# Patient Record
Sex: FEMALE | Race: WHITE | URBAN - METROPOLITAN AREA
[De-identification: names, ages, dates, MRNs, and addresses within clinical notes are randomized per-mention and may not be internally consistent; named-entity substitution may affect disease eponyms.]

---

## 2018-08-27 PROBLEM — R00.1 SINUS BRADYCARDIA: Status: ACTIVE | Noted: 2018-08-27

## 2018-08-27 PROBLEM — R55 SYNCOPE AND COLLAPSE: Status: ACTIVE | Noted: 2018-08-27

## 2018-08-27 PROBLEM — S00.83XA TRAUMATIC HEMATOMA OF FOREHEAD: Status: ACTIVE | Noted: 2018-08-27

## 2018-08-27 PROBLEM — I48.91 NEW ONSET ATRIAL FIBRILLATION (HCC): Status: ACTIVE | Noted: 2018-08-27

## 2018-08-27 PROBLEM — R06.02 SOB (SHORTNESS OF BREATH): Status: ACTIVE | Noted: 2018-08-27

## 2018-08-27 PROBLEM — K74.69 NON-ALCOHOLIC MICRONODULAR CIRRHOSIS OF LIVER (HCC): Status: ACTIVE | Noted: 2018-08-27

## 2018-08-27 PROBLEM — R94.31 ABNORMAL FINDING ON EKG: Status: ACTIVE | Noted: 2018-08-27

## 2018-08-27 PROBLEM — S09.90XA CLOSED HEAD INJURY: Status: ACTIVE | Noted: 2018-08-27

## 2018-08-27 PROBLEM — E83.01 WILSON'S DISEASE: Status: ACTIVE | Noted: 2018-08-27

## 2018-08-27 PROBLEM — I21.29 SEPTAL INFARCTION (HCC): Status: ACTIVE | Noted: 2018-08-27

## 2018-08-27 PROBLEM — K74.60 LIVER CIRRHOSIS (HCC): Status: ACTIVE | Noted: 2018-08-27

## 2018-08-27 PROBLEM — R51.9 HEADACHE DISORDER: Status: ACTIVE | Noted: 2018-08-27

## 2018-08-27 PROBLEM — F41.9 ANXIETY DISORDER: Status: ACTIVE | Noted: 2018-08-27

## 2018-08-27 PROBLEM — E87.6 HYPOKALEMIA: Status: ACTIVE | Noted: 2018-08-27

## 2018-08-27 PROBLEM — R06.02 SOB (SHORTNESS OF BREATH): Status: RESOLVED | Noted: 2018-08-27 | Resolved: 2018-08-27

## 2018-08-27 PROBLEM — F12.90 MARIJUANA SMOKER: Status: ACTIVE | Noted: 2018-08-27

## 2018-08-27 RX ORDER — SUMATRIPTAN 50 MG/1
50 TABLET, FILM COATED ORAL
COMMUNITY

## 2018-08-27 RX ORDER — TRIENTINE HYDROCHLORIDE 250 MG/1
250 CAPSULE ORAL 2 TIMES DAILY
COMMUNITY

## 2018-08-27 RX ORDER — CLONAZEPAM 0.5 MG/1
0.5 TABLET ORAL 2 TIMES DAILY PRN
COMMUNITY

## 2018-08-27 RX ORDER — CYCLOBENZAPRINE HCL 10 MG
5 TABLET ORAL 3 TIMES DAILY PRN
COMMUNITY

## 2018-08-27 RX ORDER — ONDANSETRON 4 MG/1
4 TABLET, FILM COATED ORAL EVERY 6 HOURS PRN
COMMUNITY

## 2018-08-27 RX ORDER — RANITIDINE 300 MG/1
300 TABLET ORAL DAILY
COMMUNITY

## 2018-08-27 RX ORDER — ASPIRIN 81 MG/1
81 TABLET, CHEWABLE ORAL DAILY
COMMUNITY

## 2018-08-30 ENCOUNTER — OFFICE VISIT (OUTPATIENT)
Dept: CARDIOLOGY CLINIC | Age: 28
End: 2018-08-30

## 2018-08-30 VITALS
HEART RATE: 76 BPM | HEIGHT: 64 IN | TEMPERATURE: 97.5 F | DIASTOLIC BLOOD PRESSURE: 66 MMHG | WEIGHT: 136.7 LBS | BODY MASS INDEX: 23.34 KG/M2 | SYSTOLIC BLOOD PRESSURE: 118 MMHG | OXYGEN SATURATION: 98 % | RESPIRATION RATE: 22 BRPM

## 2018-08-30 DIAGNOSIS — I21.29 SEPTAL INFARCTION (HCC): ICD-10-CM

## 2018-08-30 DIAGNOSIS — E83.01: ICD-10-CM

## 2018-08-30 DIAGNOSIS — R00.1 SINUS BRADYCARDIA: ICD-10-CM

## 2018-08-30 DIAGNOSIS — R94.31 ABNORMAL FINDING ON EKG: ICD-10-CM

## 2018-08-30 DIAGNOSIS — I48.91 ATRIAL FIBRILLATION WITH RVR (HCC): Primary | ICD-10-CM

## 2018-08-30 PROCEDURE — 99213 OFFICE O/P EST LOW 20 MIN: CPT | Performed by: INTERNAL MEDICINE

## 2018-08-30 RX ORDER — FLUTICASONE PROPIONATE 50 MCG
2 SPRAY, SUSPENSION (ML) NASAL
COMMUNITY
Start: 2017-12-04 | End: 2018-08-30 | Stop reason: ALTCHOICE

## 2018-08-30 ASSESSMENT — ENCOUNTER SYMPTOMS
VOMITING: 0
APNEA: 0
DIARRHEA: 0
NAUSEA: 0
SHORTNESS OF BREATH: 0
CHEST TIGHTNESS: 0
BLOOD IN STOOL: 0

## 2018-08-30 NOTE — PROGRESS NOTES
Subsequent Progress Note  Patient: Ansley Leiva  YOB: 1990  MRN: [id number]    Chief Complaint:  Chief Complaint   Patient presents with    Follow-Up from [facility]         Subjective/HPI:  8/30/18: Patient presents today for Follow-up of recent hospitalization for atrial fibrillation. Patient of [place] [id number]. Has Ahsan's disease. She will see specialist at [facility]. Her previous MD left. Echo was normal. She is on low-dose beta blocker. With a low chads score. She'll see me in 3 months. Past Medical History:   Diagnosis Date    Abnormal finding on EKG 8/27/2018    Anxiety disorder 8/27/2018    Atrial fibrillation with RVR (HCC) 8/27/2018    Closed head injury 8/27/2018    Headache disorder 8/27/2018    Hypokalemia 8/27/2018    Marijuana smoker 5/11/7525    Non-alcoholic micronodular cirrhosis of liver ([facility] [address] 8/27/2018    Septal infarction ([facility] [address] 8/27/2018    Sinus bradycardia 8/27/2018    SOB (shortness of breath) 8/27/2018    Syncope and collapse 8/27/2018    Traumatic hematoma of forehead 8/27/2018    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 No family history on file. Social History     Social History    Marital status: Single     Spouse name: N/A    Number of children: N/A    Years of education: N/A     Social History Main Topics    Smoking status: Never Smoker    Smokeless tobacco: Never Used    Alcohol use Yes      Comment: OCCASIONALLY    Drug use: Yes     Types: Marijuana      Comment: REGULARLY    Sexual activity: Yes     Partners: Male     Other Topics Concern    None     Social History Narrative    None       Allergies   Allergen Reactions    Metoclopramide Anxiety     FROM [facility]       Current Outpatient Prescriptions   Medication Sig Dispense Refill    aspirin 81 MG chewable tablet Take 81 mg by mouth daily      clonazePAM (KLONOPIN) 0.5 MG tablet Take 0.5 mg by mouth 2 times daily as needed for Anxiety. Vonda Valadez equal, round, and reactive to light. Conjunctivae are normal.   Neck: Normal range of motion and thyroid normal. Neck supple. Cardiovascular: Regular rhythm, S1 normal, S2 normal, normal heart sounds, intact distal pulses and normal pulses. PMI is not displaced. No murmur heard. Pulmonary/Chest: She has no wheezes. She has no rales. She exhibits no tenderness. Abdominal: Soft. Bowel sounds are normal. She exhibits no distension and no mass. There is no splenomegaly or hepatomegaly. There is no tenderness. No hernia. Neurological: She is alert and oriented to person, place, and time. She has normal motor skills. Gait normal.   Skin: Skin is warm and dry. No cyanosis. No jaundice. Nails show no clubbing.        LABS:  CBC: No results found for: WBC, RBC, HGB, HCT, MCV, MCH, MCHC, RDW, PLT, MPV  Lipids:No results found for: CHOL  No results found for: TRIG  No results found for: HDL  No results found for: LDLCHOLESTEROL, LDLCALC  No results found for: LABVLDL, VLDL  No results found for: CHOLHDLRATIO  CMP:  No results found for: NA, K, CL, CO2, BUN, CREATININE, GFRAA, AGRATIO, LABGLOM, GLUCOSE, PROT, LABALBU, CALCIUM, BILITOT, ALKPHOS, AST, ALT  BMP:  No results found for: NA, K, CL, CO2, BUN, LABALBU, CREATININE, CALCIUM, GFRAA, LABGLOM, GLUCOSE  Magnesium:  No results found for: MG  TSH:No results found for: TSHFT4, TSH    Patient Active Problem List   Diagnosis    Atrial fibrillation with RVR (HCC)    Abnormal finding on EKG    Sinus bradycardia    Septal infarction ([address]    Anxiety disorder    Non-alcoholic micronodular cirrhosis of liver (HCC)    Ahsan's disease    Closed head injury    Headache disorder    Hypokalemia    SOB (shortness of breath)    Syncope and collapse    Traumatic hematoma of forehead    Marijuana smoker       Medications Discontinued During This Encounter   Medication Reason    fluticasone (FLONASE) 50 MCG/ACT nasal spray Therapy completed       Modified Medications

## 2018-12-06 ENCOUNTER — OFFICE VISIT (OUTPATIENT)
Dept: CARDIOLOGY CLINIC | Age: 28
End: 2018-12-06
Payer: COMMERCIAL

## 2018-12-06 VITALS
DIASTOLIC BLOOD PRESSURE: 84 MMHG | BODY MASS INDEX: 24.02 KG/M2 | WEIGHT: 140.7 LBS | SYSTOLIC BLOOD PRESSURE: 122 MMHG | RESPIRATION RATE: 22 BRPM | OXYGEN SATURATION: 97 % | HEART RATE: 82 BPM | HEIGHT: 64 IN

## 2018-12-06 DIAGNOSIS — I48.91 ATRIAL FIBRILLATION WITH RVR (HCC): Primary | ICD-10-CM

## 2018-12-06 DIAGNOSIS — R00.1 SINUS BRADYCARDIA: ICD-10-CM

## 2018-12-06 DIAGNOSIS — R94.31 ABNORMAL FINDING ON EKG: ICD-10-CM

## 2018-12-06 DIAGNOSIS — I21.29 SEPTAL INFARCTION (HCC): ICD-10-CM

## 2018-12-06 PROCEDURE — 99213 OFFICE O/P EST LOW 20 MIN: CPT | Performed by: INTERNAL MEDICINE

## 2018-12-06 ASSESSMENT — ENCOUNTER SYMPTOMS
DIARRHEA: 0
BLOOD IN STOOL: 0
SHORTNESS OF BREATH: 0
VOMITING: 0
NAUSEA: 0
APNEA: 0
COLOR CHANGE: 0
CHEST TIGHTNESS: 0

## 2018-12-06 NOTE — PROGRESS NOTES
(Site: Left Upper Arm, Position: Sitting, Cuff Size: Medium Adult)   Pulse 82   Resp 22   Ht 5' 4\" (1.626 m)   Wt 140 lb 11.2 oz (63.8 kg)   LMP 11/13/2018 (Approximate)   SpO2 97%   BMI 24.15 kg/m²    Physical Exam   Constitutional: She appears healthy. HENT:   Nose: Nose normal.   Mouth/Throat: Dentition is normal. Oropharynx is clear. Eyes: Pupils are equal, round, and reactive to light. Neck: Normal range of motion. Cardiovascular: Normal rate, regular rhythm, S1 normal, S2 normal, normal heart sounds, intact distal pulses and normal pulses. No extrasystoles are present. Exam reveals no gallop. No murmur heard. Pulmonary/Chest: Effort normal and breath sounds normal. She has no wheezes. She has no rales. She exhibits no tenderness. Abdominal: Soft. Bowel sounds are normal. She exhibits no distension and no mass. There is no splenomegaly or hepatomegaly. There is no tenderness. Musculoskeletal: Normal range of motion. She exhibits no edema, tenderness or deformity. Neurological: She is alert and oriented to person, place, and time. She has normal motor skills and normal reflexes. Gait normal.   Skin: Skin is warm and dry.        LABS:  CBC: No results found for: WBC, RBC, HGB, HCT, MCV, MCH, MCHC, RDW, PLT, MPV  Lipids:No results found for: CHOL  No results found for: TRIG  No results found for: HDL  No results found for: LDLCHOLESTEROL, LDLCALC  No results found for: LABVLDL, VLDL  No results found for: CHOLHDLRATIO  CMP:  No results found for: NA, K, CL, CO2, BUN, CREATININE, GFRAA, AGRATIO, LABGLOM, GLUCOSE, PROT, LABALBU, CALCIUM, BILITOT, ALKPHOS, AST, ALT  BMP:  No results found for: NA, K, CL, CO2, BUN, LABALBU, CREATININE, CALCIUM, GFRAA, LABGLOM, GLUCOSE  Magnesium:  No results found for: MG  TSH:No results found for: TSHFT4, TSH    Patient Active Problem List   Diagnosis    Atrial fibrillation with RVR (HCC)    Abnormal finding on EKG    Sinus bradycardia    Septal

## 2019-01-23 PROBLEM — H52.203 MYOPIC ASTIGMATISM OF BOTH EYES: Status: ACTIVE | Noted: 2018-10-09

## 2019-01-23 PROBLEM — H52.13 MYOPIC ASTIGMATISM OF BOTH EYES: Status: ACTIVE | Noted: 2018-10-09

## 2019-01-24 ENCOUNTER — OFFICE VISIT (OUTPATIENT)
Dept: CARDIOLOGY CLINIC | Age: 29
End: 2019-01-24
Payer: COMMERCIAL

## 2019-01-24 VITALS
HEART RATE: 73 BPM | BODY MASS INDEX: 24.07 KG/M2 | SYSTOLIC BLOOD PRESSURE: 118 MMHG | RESPIRATION RATE: 12 BRPM | HEIGHT: 64 IN | WEIGHT: 141 LBS | DIASTOLIC BLOOD PRESSURE: 74 MMHG

## 2019-01-24 DIAGNOSIS — E83.01: ICD-10-CM

## 2019-01-24 DIAGNOSIS — I48.91 ATRIAL FIBRILLATION WITH RVR (HCC): Primary | ICD-10-CM

## 2019-01-24 PROCEDURE — G8427 DOCREV CUR MEDS BY ELIG CLIN: HCPCS | Performed by: INTERNAL MEDICINE

## 2019-01-24 PROCEDURE — G8420 CALC BMI NORM PARAMETERS: HCPCS | Performed by: INTERNAL MEDICINE

## 2019-01-24 PROCEDURE — 99213 OFFICE O/P EST LOW 20 MIN: CPT | Performed by: INTERNAL MEDICINE

## 2019-01-24 PROCEDURE — G8598 ASA/ANTIPLAT THER USED: HCPCS | Performed by: INTERNAL MEDICINE

## 2019-01-24 PROCEDURE — G8484 FLU IMMUNIZE NO ADMIN: HCPCS | Performed by: INTERNAL MEDICINE

## 2019-01-24 PROCEDURE — 1036F TOBACCO NON-USER: CPT | Performed by: INTERNAL MEDICINE

## 2019-01-24 ASSESSMENT — ENCOUNTER SYMPTOMS
COUGH: 0
SHORTNESS OF BREATH: 0
ABDOMINAL PAIN: 0
VOMITING: 0
NAUSEA: 0
ABDOMINAL DISTENTION: 0
BLOOD IN STOOL: 0
COLOR CHANGE: 0
DIARRHEA: 0
APNEA: 0
CHEST TIGHTNESS: 0
ANAL BLEEDING: 0

## 2023-07-18 ENCOUNTER — APPOINTMENT (OUTPATIENT)
Dept: PRIMARY CARE | Facility: CLINIC | Age: 33
End: 2023-07-18
Payer: COMMERCIAL

## 2023-07-20 ENCOUNTER — APPOINTMENT (OUTPATIENT)
Dept: PRIMARY CARE | Facility: CLINIC | Age: 33
End: 2023-07-20
Payer: COMMERCIAL

## 2023-08-01 ENCOUNTER — APPOINTMENT (OUTPATIENT)
Dept: PRIMARY CARE | Facility: CLINIC | Age: 33
End: 2023-08-01
Payer: COMMERCIAL

## 2023-09-12 ENCOUNTER — OFFICE VISIT (OUTPATIENT)
Dept: PRIMARY CARE | Facility: CLINIC | Age: 33
End: 2023-09-12
Payer: MEDICARE

## 2023-09-12 VITALS
WEIGHT: 159 LBS | SYSTOLIC BLOOD PRESSURE: 108 MMHG | OXYGEN SATURATION: 97 % | TEMPERATURE: 97.4 F | HEIGHT: 65 IN | BODY MASS INDEX: 26.49 KG/M2 | HEART RATE: 83 BPM | DIASTOLIC BLOOD PRESSURE: 60 MMHG

## 2023-09-12 DIAGNOSIS — I48.20 ATRIAL FIBRILLATION, CHRONIC (MULTI): ICD-10-CM

## 2023-09-12 DIAGNOSIS — A49.02 MRSA INFECTION: Primary | ICD-10-CM

## 2023-09-12 DIAGNOSIS — E83.01 WILSON'S DISEASE (MULTI): ICD-10-CM

## 2023-09-12 DIAGNOSIS — K74.69 OTHER CIRRHOSIS OF LIVER (MULTI): ICD-10-CM

## 2023-09-12 PROBLEM — A60.00 GENITAL HSV: Status: ACTIVE | Noted: 2023-09-12

## 2023-09-12 PROBLEM — K21.9 GASTROESOPHAGEAL REFLUX DISEASE WITHOUT ESOPHAGITIS: Status: ACTIVE | Noted: 2023-09-12

## 2023-09-12 PROBLEM — K74.60 CIRRHOSIS (MULTI): Status: ACTIVE | Noted: 2023-09-12

## 2023-09-12 PROBLEM — O14.90 PRE-ECLAMPSIA, ANTEPARTUM (HHS-HCC): Status: ACTIVE | Noted: 2023-09-12

## 2023-09-12 PROBLEM — F41.9 ANXIETY: Status: ACTIVE | Noted: 2023-09-12

## 2023-09-12 PROCEDURE — 1036F TOBACCO NON-USER: CPT | Performed by: FAMILY MEDICINE

## 2023-09-12 PROCEDURE — 3074F SYST BP LT 130 MM HG: CPT | Performed by: FAMILY MEDICINE

## 2023-09-12 PROCEDURE — 99203 OFFICE O/P NEW LOW 30 MIN: CPT | Performed by: FAMILY MEDICINE

## 2023-09-12 PROCEDURE — 3078F DIAST BP <80 MM HG: CPT | Performed by: FAMILY MEDICINE

## 2023-09-12 RX ORDER — TRIENTINE HYDROCHLORIDE 250 MG/1
750 CAPSULE ORAL 2 TIMES DAILY
COMMUNITY

## 2023-09-12 RX ORDER — CLONAZEPAM 0.5 MG/1
0.5 TABLET ORAL 3 TIMES DAILY PRN
COMMUNITY

## 2023-09-12 RX ORDER — ONDANSETRON 8 MG/1
8 TABLET, ORALLY DISINTEGRATING ORAL EVERY 8 HOURS PRN
COMMUNITY
Start: 2023-08-17

## 2023-09-12 RX ORDER — METOPROLOL TARTRATE 25 MG/1
12.5 TABLET, FILM COATED ORAL 2 TIMES DAILY
COMMUNITY

## 2023-09-12 RX ORDER — VALACYCLOVIR HYDROCHLORIDE 500 MG/1
500 TABLET, FILM COATED ORAL EVERY 12 HOURS
COMMUNITY
Start: 2022-10-10

## 2023-09-12 ASSESSMENT — ENCOUNTER SYMPTOMS: SHORTNESS OF BREATH: 0

## 2023-09-12 NOTE — PROGRESS NOTES
Assessment     ASSESSMENT/PLAN:      Problem List Items Addressed This Visit       Kulwinder's disease    Cirrhosis (CMS/HCC)    Atrial fibrillation, chronic (CMS/HCC)    Relevant Medications    metoprolol tartrate (Lopressor) 25 mg tablet    Other Relevant Orders    Referral to Cardiology     Other Visit Diagnoses       MRSA infection    -  Primary            Patient Instructions:  Patient Instructions   Recurrent MRSA: Discussed possible need of daily antibiotics, patient declined at this time, doing well otherwise  History of A-fib: Patient is currently rate regulated with metoprolol 12.5 mg twice daily however she is not anticoagulated, will refer to cardiology      Signed by: Christiana Edmond DO       FUTURE DIRECTION:   none    Subjective   SUBJECTIVE:     HPI : Patient is a 32 y.o. female who presents today for the following:     History of MRSA  - occurs frequently in different arreas  - doing well now     Wilsons disease and cirrhosis of liver   - Follows [facility]    Afib  - occurred during pregnancy   - taking metoprolol tartrate 12.5mg BID     Anxiety  - tremors and anxiety   - follows neurology   - [facility]    History of Seizures   - no seizure in the past 7 years   - follows neurologist     Preventative   PAP: S/p tubal ligation, follows OBGYN     Review of Systems   Respiratory:  Negative for shortness of breath.    Cardiovascular:  Negative for chest pain.       Past Medical History:   Diagnosis Date    Abnormal findings on diagnostic imaging of liver and biliary tract     Abnormal ultrasound of gallbladder    Calculus of gallbladder without cholecystitis without obstruction 06/18/2015    Gall stones    Other specified postprocedural states     History of endoscopy    Personal history of other diseases of the circulatory system 06/29/2022    History of atrial fibrillation    Personal history of other diseases of the respiratory system 02/20/2018    History of influenza    Personal history of  other medical treatment     History of HIDA scan    Unspecified convulsions (CMS/HCC) 2016    Seizure        Past Surgical History:   Procedure Laterality Date    BREAST SURGERY  2016    Breast Surgery    CHOLECYSTECTOMY  2022    Cholecystectomy Laparoscopic    DILATION AND CURETTAGE OF UTERUS  2016    Dilation And Curettage    MOUTH SURGERY  2022    Oral Surgery Tooth Extraction    MR HEAD ANGIO WO IV CONTRAST  10/10/2022    MR HEAD ANGIO WO IV CONTRAST 10/10/2022 [facility]    OTHER SURGICAL HISTORY  2022    Biopsy Of Liver    OTHER SURGICAL HISTORY  2022     section    TONSILLECTOMY  2022    Tonsillectomy With Adenoidectomy    US GUIDED NEEDLE LIVER BIOPSY  2013    US GUIDED NEEDLE LIVER BIOPSY 2013 [facility]        Current Outpatient Medications   Medication Instructions    clonazePAM (KLONOPIN) 0.5 mg, oral, 3 times daily PRN    metoprolol tartrate (LOPRESSOR) 12.5 mg, oral, 2 times daily    ondansetron ODT (ZOFRAN-ODT) 8 mg, oral, Every 8 hours PRN    Syprine 750 mg, oral, 2 times daily    valACYclovir (VALTREX) 500 mg, oral, Every 12 hours        Allergies   Allergen Reactions    Metoclopramide Other     Reglan    Promethazine Unknown     Phenergan        Social History     Socioeconomic History    Marital status:      Spouse name: Not on file    Number of children: Not on file    Years of education: Not on file    Highest education level: Not on file   Occupational History    Not on file   Tobacco Use    Smoking status: Former     Types: Cigarettes     Quit date:      Years since quitting: 10.7    Smokeless tobacco: Never   Substance and Sexual Activity    Alcohol use: Not on file    Drug use: Yes     Types: Marijuana    Sexual activity: Not on file   Other Topics Concern    Not on file   Social History Narrative    Not on file     Social Determinants of Health     Financial Resource Strain: Not on file   Food  "Insecurity: Not on file   Transportation Needs: Not on file   Physical Activity: Not on file   Stress: Not on file   Social Connections: Not on file   Intimate Partner Violence: Not on file        Family History   Problem Relation Name Age of Onset    Heart disease Mother      Other (dyslipdemia) Father      Hypertension Father          Objective     OBJECTIVE:     Vitals:    09/12/23 1150   BP: 108/60   Pulse: 83   Temp: 36.3 °C (97.4 °F)   SpO2: 97%   Weight: 72.1 kg (159 lb)   Height: 1.651 m (5' 5\")        Physical Exam  Constitutional:       Appearance: Normal appearance.   HENT:      Head: Normocephalic.   Pulmonary:      Effort: Pulmonary effort is normal.   Musculoskeletal:      Cervical back: Normal range of motion.   Neurological:      Mental Status: She is alert.   Psychiatric:         Mood and Affect: Mood normal.             "

## 2023-09-12 NOTE — PATIENT INSTRUCTIONS
Recurrent MRSA: Discussed possible need of daily antibiotics, patient declined at this time, doing well otherwise  History of A-fib: Patient is currently rate regulated with metoprolol 12.5 mg twice daily however she is not anticoagulated, will refer to cardiology

## 2023-09-25 LAB
COPPER, 24 HOUR URINE: 472 UG/24 HR (ref 3–35)
COPPER, URINE: 429 UG/L
COPPER/CREATININE RATIO URINE: 333 UG/G CREAT (ref 0–49)
CREATININE URINE (LC): 1.29 G/L (ref 0.3–3)
VOLUME OF URINE (LC): 1100

## 2023-12-13 ENCOUNTER — HOSPITAL ENCOUNTER (OUTPATIENT)
Dept: RADIOLOGY | Facility: HOSPITAL | Age: 33
Discharge: HOME | End: 2023-12-13
Payer: COMMERCIAL

## 2023-12-13 DIAGNOSIS — K74.69 OTHER CIRRHOSIS OF LIVER (MULTI): ICD-10-CM

## 2023-12-13 DIAGNOSIS — E83.01 WILSON'S DISEASE (MULTI): ICD-10-CM

## 2023-12-13 PROCEDURE — 76705 ECHO EXAM OF ABDOMEN: CPT

## 2023-12-13 PROCEDURE — 76705 ECHO EXAM OF ABDOMEN: CPT | Performed by: RADIOLOGY

## 2024-01-17 ENCOUNTER — APPOINTMENT (OUTPATIENT)
Dept: RADIOLOGY | Facility: CLINIC | Age: 34
End: 2024-01-17
Payer: COMMERCIAL

## 2024-01-22 ENCOUNTER — APPOINTMENT (OUTPATIENT)
Dept: RADIOLOGY | Facility: HOSPITAL | Age: 34
End: 2024-01-22
Payer: COMMERCIAL

## 2024-04-18 ENCOUNTER — HOSPITAL ENCOUNTER (OUTPATIENT)
Dept: RADIOLOGY | Facility: CLINIC | Age: 34
Discharge: HOME | End: 2024-04-18
Payer: COMMERCIAL

## 2024-04-18 DIAGNOSIS — K76.9 LIVER DISEASE, UNSPECIFIED: ICD-10-CM

## 2024-04-18 PROCEDURE — A9575 INJ GADOTERATE MEGLUMI 0.1ML: HCPCS | Performed by: INTERNAL MEDICINE

## 2024-04-18 PROCEDURE — 74183 MRI ABD W/O CNTR FLWD CNTR: CPT

## 2024-04-18 PROCEDURE — 2550000001 HC RX 255 CONTRASTS: Performed by: INTERNAL MEDICINE

## 2024-04-18 RX ORDER — GADOTERATE MEGLUMINE 376.9 MG/ML
0.2 INJECTION INTRAVENOUS
Status: COMPLETED | OUTPATIENT
Start: 2024-04-18 | End: 2024-04-18

## 2024-04-18 RX ADMIN — GADOTERATE MEGLUMINE 12.5 ML: 376.9 INJECTION INTRAVENOUS at 12:02

## 2024-04-24 ENCOUNTER — OFFICE VISIT (OUTPATIENT)
Dept: PRIMARY CARE | Facility: CLINIC | Age: 34
End: 2024-04-24
Payer: COMMERCIAL

## 2024-04-24 VITALS
SYSTOLIC BLOOD PRESSURE: 118 MMHG | TEMPERATURE: 97.5 F | OXYGEN SATURATION: 96 % | DIASTOLIC BLOOD PRESSURE: 80 MMHG | HEART RATE: 96 BPM

## 2024-04-24 DIAGNOSIS — J04.0 ACUTE LARYNGITIS: Primary | ICD-10-CM

## 2024-04-24 PROBLEM — E28.2 PCOS (POLYCYSTIC OVARIAN SYNDROME): Status: ACTIVE | Noted: 2024-04-24

## 2024-04-24 PROBLEM — I10 ESSENTIAL HYPERTENSION: Status: ACTIVE | Noted: 2019-10-20

## 2024-04-24 PROBLEM — R56.9 SEIZURE (MULTI): Status: ACTIVE | Noted: 2024-04-24

## 2024-04-24 LAB — POC RAPID STREP: NEGATIVE

## 2024-04-24 PROCEDURE — 87880 STREP A ASSAY W/OPTIC: CPT | Performed by: FAMILY MEDICINE

## 2024-04-24 PROCEDURE — 99213 OFFICE O/P EST LOW 20 MIN: CPT | Performed by: FAMILY MEDICINE

## 2024-04-24 PROCEDURE — 3074F SYST BP LT 130 MM HG: CPT | Performed by: FAMILY MEDICINE

## 2024-04-24 PROCEDURE — 87081 CULTURE SCREEN ONLY: CPT

## 2024-04-24 PROCEDURE — 3079F DIAST BP 80-89 MM HG: CPT | Performed by: FAMILY MEDICINE

## 2024-04-24 PROCEDURE — 1036F TOBACCO NON-USER: CPT | Performed by: FAMILY MEDICINE

## 2024-04-24 RX ORDER — PREDNISONE 10 MG/1
TABLET ORAL DAILY
Qty: 11 TABLET | Refills: 0 | Status: SHIPPED | OUTPATIENT
Start: 2024-04-24 | End: 2024-04-29

## 2024-04-24 NOTE — PATIENT INSTRUCTIONS
1. Acute laryngitis  Will treat initially with steroid taper, we will send culture for strep and order mono tests  - Mononucleosis screen; Future  - predniSONE (Deltasone) 10 mg tablet; Take 4 tablets (40 mg) by mouth once daily for 1 day, THEN 3 tablets (30 mg) once daily for 1 day, THEN 2 tablets (20 mg) once daily for 1 day, THEN 1 tablet (10 mg) once daily for 1 day, THEN 0.5 tablets (5 mg) once daily for 2 days.  Dispense: 11 tablet; Refill: 0  - POCT Rapid Strep A manually resulted

## 2024-04-24 NOTE — PROGRESS NOTES
Assessment/Plan   ASSESSMENT/PLAN:      Patient Instructions   1. Acute laryngitis  Will treat initially with steroid taper, we will send culture for strep and order mono tests  - Mononucleosis screen; Future  - predniSONE (Deltasone) 10 mg tablet; Take 4 tablets (40 mg) by mouth once daily for 1 day, THEN 3 tablets (30 mg) once daily for 1 day, THEN 2 tablets (20 mg) once daily for 1 day, THEN 1 tablet (10 mg) once daily for 1 day, THEN 0.5 tablets (5 mg) once daily for 2 days.  Dispense: 11 tablet; Refill: 0  - POCT Rapid Strep A manually resulted         Christiana Edmond,      FUTURE DIRECTION:     Subjective   SUBJECTIVE:     Patient ID: Kristie Hunt is a 33 y.o. femalefor the following:    Patient states that over the past month has been experiencing hoarseness of her throat and mild cough. She states that her 2 sons have been sick and she feels that she may have gotten something from them. She denies fevers or chills      Review of Systems    Allergies   Allergen Reactions    Iothalamic Acid Other    Levamisole Other    Metoclopramide Other     Reglan    Promethazine Unknown     Phenergan         Current Outpatient Medications:     clonazePAM (KlonoPIN) 0.5 mg tablet, Take 1 tablet (0.5 mg) by mouth 3 times a day as needed for anxiety., Disp: , Rfl:     metoprolol tartrate (Lopressor) 25 mg tablet, Take 0.5 tablets (12.5 mg) by mouth 2 times a day., Disp: , Rfl:     ondansetron ODT (Zofran-ODT) 8 mg disintegrating tablet, Take 1 tablet (8 mg) by mouth every 8 hours if needed., Disp: , Rfl:     Syprine 250 mg capsule, Take 3 capsules (750 mg) by mouth 2 times a day., Disp: , Rfl:     valACYclovir (Valtrex) 500 mg tablet, Take 1 tablet (500 mg) by mouth every 12 hours., Disp: , Rfl:     predniSONE (Deltasone) 10 mg tablet, Take 4 tablets (40 mg) by mouth once daily for 1 day, THEN 3 tablets (30 mg) once daily for 1 day, THEN 2 tablets (20 mg) once daily for 1 day, THEN 1 tablet (10 mg) once daily for  1 day, THEN 0.5 tablets (5 mg) once daily for 2 days., Disp: 11 tablet, Rfl: 0     Patient Active Problem List   Diagnosis    Kulwinder's disease (Multi)    Pre-eclampsia, antepartum (HHS-HCC)    Genital HSV    Gastroesophageal reflux disease without esophagitis    Cirrhosis (Multi)    Atrial fibrillation, chronic (Multi)    Anxiety    Essential hypertension    Migraine    PCOS (polycystic ovarian syndrome)    Seizure (Multi)       Social History     Socioeconomic History    Marital status:      Spouse name: Not on file    Number of children: Not on file    Years of education: Not on file    Highest education level: Not on file   Occupational History    Not on file   Tobacco Use    Smoking status: Former     Current packs/day: 0.00     Types: Cigarettes     Quit date:      Years since quittin.3    Smokeless tobacco: Never   Vaping Use    Vaping status: Never Used   Substance and Sexual Activity    Alcohol use: Yes     Alcohol/week: 2.0 standard drinks of alcohol     Types: 2 Standard drinks or equivalent per week    Drug use: Yes     Types: Marijuana    Sexual activity: Not on file   Other Topics Concern    Not on file   Social History Narrative    Not on file     Social Determinants of Health     Financial Resource Strain: Not on file   Food Insecurity: Not on file   Transportation Needs: Not on file   Physical Activity: Not on file   Stress: Not on file   Social Connections: Not on file   Intimate Partner Violence: Not on file       Objective   OBJECTIVE:     Vitals:    24 1528   BP: 118/80   Pulse: 96   Temp: 36.4 °C (97.5 °F)   SpO2: 96%       Exam      Physical Exam  Constitutional:       Appearance: Normal appearance.   HENT:      Head: Normocephalic.      Right Ear: External ear normal.      Left Ear: External ear normal.      Nose: Nose normal.      Mouth/Throat:      Pharynx: Posterior oropharyngeal erythema present. No oropharyngeal exudate.   Pulmonary:      Effort: Pulmonary effort is  normal.   Musculoskeletal:      Cervical back: Normal range of motion.   Neurological:      Mental Status: She is alert.   Psychiatric:         Mood and Affect: Mood normal.           Results for orders placed or performed in visit on 04/24/24   POCT Rapid Strep A manually resulted   Result Value Ref Range    POC Rapid Strep Negative Negative

## 2024-04-27 LAB — S PYO THROAT QL CULT: NORMAL

## 2024-05-23 ENCOUNTER — LAB REQUISITION (OUTPATIENT)
Dept: LAB | Facility: HOSPITAL | Age: 34
End: 2024-05-23
Payer: COMMERCIAL

## 2024-05-23 DIAGNOSIS — R30.0 DYSURIA: ICD-10-CM

## 2024-05-23 PROCEDURE — 87186 SC STD MICRODIL/AGAR DIL: CPT

## 2024-05-23 PROCEDURE — 87086 URINE CULTURE/COLONY COUNT: CPT

## 2024-05-26 LAB — BACTERIA UR CULT: ABNORMAL

## 2024-06-26 ENCOUNTER — APPOINTMENT (OUTPATIENT)
Dept: PRIMARY CARE | Facility: CLINIC | Age: 34
End: 2024-06-26
Payer: COMMERCIAL

## 2024-07-03 ENCOUNTER — APPOINTMENT (OUTPATIENT)
Dept: PRIMARY CARE | Facility: CLINIC | Age: 34
End: 2024-07-03
Payer: COMMERCIAL

## 2024-07-11 ENCOUNTER — OFFICE VISIT (OUTPATIENT)
Dept: PRIMARY CARE | Facility: CLINIC | Age: 34
End: 2024-07-11
Payer: COMMERCIAL

## 2024-07-11 VITALS
DIASTOLIC BLOOD PRESSURE: 64 MMHG | SYSTOLIC BLOOD PRESSURE: 102 MMHG | TEMPERATURE: 97.3 F | WEIGHT: 137 LBS | BODY MASS INDEX: 23.52 KG/M2

## 2024-07-11 DIAGNOSIS — M79.642 PAIN OF LEFT HAND: Primary | ICD-10-CM

## 2024-07-11 PROCEDURE — 3078F DIAST BP <80 MM HG: CPT | Performed by: FAMILY MEDICINE

## 2024-07-11 PROCEDURE — 1036F TOBACCO NON-USER: CPT | Performed by: FAMILY MEDICINE

## 2024-07-11 PROCEDURE — 99214 OFFICE O/P EST MOD 30 MIN: CPT | Performed by: FAMILY MEDICINE

## 2024-07-11 PROCEDURE — 3074F SYST BP LT 130 MM HG: CPT | Performed by: FAMILY MEDICINE

## 2024-07-11 RX ORDER — PREDNISONE 10 MG/1
TABLET ORAL
Qty: 11 TABLET | Refills: 0 | Status: SHIPPED | OUTPATIENT
Start: 2024-07-11 | End: 2024-07-17

## 2024-07-11 NOTE — PATIENT INSTRUCTIONS
Unsure of source of hand pain, will get EMG to r/o cervical radiculopathy, try prednisone taper, continue brace

## 2024-07-11 NOTE — PROGRESS NOTES
Assessment/Plan   ASSESSMENT/PLAN:      Patient Instructions   Unsure of source of hand pain, will get EMG to r/o cervical radiculopathy, try prednisone taper, continue brace     Christiana Edmond,      FUTURE DIRECTION:     Subjective   SUBJECTIVE:     Patient ID: Kristie Hunt is a 33 y.o. femalefor the following:    Hand Pain   Started during pregnancy a couple years ago  Reoccurring 2 month ago, described as Tingling and numbness in first two fingers that radiate up arm causing weakness. Also mention neck pain   No improvement with wrist splint     Joint pain   Also mentions diffuse joint pain   Has been working with neurologist     Review of Systems    Allergies   Allergen Reactions    Iothalamic Acid Other    Levamisole Other    Metoclopramide Other     Reglan    Promethazine Unknown     Phenergan         Current Outpatient Medications:     clonazePAM (KlonoPIN) 0.5 mg tablet, Take 1 tablet (0.5 mg) by mouth 3 times a day as needed for anxiety., Disp: , Rfl:     metoprolol tartrate (Lopressor) 25 mg tablet, Take 0.5 tablets (12.5 mg) by mouth 2 times a day., Disp: , Rfl:     ondansetron ODT (Zofran-ODT) 8 mg disintegrating tablet, Take 1 tablet (8 mg) by mouth every 8 hours if needed., Disp: , Rfl:     Syprine 250 mg capsule, Take 3 capsules (750 mg) by mouth 2 times a day., Disp: , Rfl:     valACYclovir (Valtrex) 500 mg tablet, Take 1 tablet (500 mg) by mouth every 12 hours., Disp: , Rfl:     predniSONE (Deltasone) 10 mg tablet, Take 4 tablets (40 mg) by mouth once daily for 1 day, THEN 3 tablets (30 mg) once daily for 1 day, THEN 2 tablets (20 mg) once daily for 1 day, THEN 1 tablet (10 mg) once daily for 1 day, THEN 0.5 tablets (5 mg) once daily for 2 days., Disp: 11 tablet, Rfl: 0     Patient Active Problem List   Diagnosis    Kulwinder's disease (Multi)    Pre-eclampsia, antepartum (HHS-HCC)    Genital HSV    Gastroesophageal reflux disease without esophagitis    Cirrhosis (Multi)    Atrial  fibrillation, chronic (Multi)    Anxiety    Essential hypertension    Migraine    PCOS (polycystic ovarian syndrome)    Seizure (Multi)       Social History     Socioeconomic History    Marital status:      Spouse name: Not on file    Number of children: Not on file    Years of education: Not on file    Highest education level: Not on file   Occupational History    Not on file   Tobacco Use    Smoking status: Former     Current packs/day: 0.00     Types: Cigarettes     Quit date:      Years since quittin.5    Smokeless tobacco: Never   Vaping Use    Vaping status: Never Used   Substance and Sexual Activity    Alcohol use: Yes     Alcohol/week: 2.0 standard drinks of alcohol     Types: 2 Standard drinks or equivalent per week    Drug use: Yes     Types: Marijuana    Sexual activity: Not on file   Other Topics Concern    Not on file   Social History Narrative    Not on file     Social Determinants of Health     Financial Resource Strain: Not on file   Food Insecurity: Not on file   Transportation Needs: Not on file   Physical Activity: Not on file   Stress: Not on file   Social Connections: Not on file   Intimate Partner Violence: Not on file       Objective   OBJECTIVE:     Vitals:    24 1005   BP: 102/64   Temp: 36.3 °C (97.3 °F)       Exam      Physical Exam  Constitutional:       Appearance: Normal appearance.   HENT:      Head: Normocephalic.   Pulmonary:      Effort: Pulmonary effort is normal.   Musculoskeletal:      Cervical back: Normal range of motion.      Comments: BL hand with 5/5 strength, decreased sensation of left hand, + Tinels   Neurological:      Mental Status: She is alert.   Psychiatric:         Mood and Affect: Mood normal.

## 2024-08-08 ENCOUNTER — APPOINTMENT (OUTPATIENT)
Dept: PRIMARY CARE | Facility: CLINIC | Age: 34
End: 2024-08-08
Payer: COMMERCIAL

## 2024-08-08 VITALS
OXYGEN SATURATION: 97 % | DIASTOLIC BLOOD PRESSURE: 70 MMHG | BODY MASS INDEX: 23.69 KG/M2 | TEMPERATURE: 98 F | HEART RATE: 88 BPM | WEIGHT: 138 LBS | SYSTOLIC BLOOD PRESSURE: 102 MMHG

## 2024-08-08 DIAGNOSIS — M75.22 BICEPS TENDINITIS OF LEFT UPPER EXTREMITY: ICD-10-CM

## 2024-08-08 DIAGNOSIS — M25.552 PAIN OF LEFT HIP: Primary | ICD-10-CM

## 2024-08-08 DIAGNOSIS — M79.642 PAIN OF LEFT HAND: ICD-10-CM

## 2024-08-08 PROCEDURE — 1036F TOBACCO NON-USER: CPT | Performed by: FAMILY MEDICINE

## 2024-08-08 PROCEDURE — 3078F DIAST BP <80 MM HG: CPT | Performed by: FAMILY MEDICINE

## 2024-08-08 PROCEDURE — 99214 OFFICE O/P EST MOD 30 MIN: CPT | Performed by: FAMILY MEDICINE

## 2024-08-08 PROCEDURE — 3074F SYST BP LT 130 MM HG: CPT | Performed by: FAMILY MEDICINE

## 2024-08-08 RX ORDER — DICLOFENAC SODIUM 10 MG/G
4 GEL TOPICAL 4 TIMES DAILY
Qty: 100 G | Refills: 1 | Status: SHIPPED | OUTPATIENT
Start: 2024-08-08

## 2024-08-08 NOTE — PATIENT INSTRUCTIONS
Order Pt for evaluation and management of both hip and hand pain  Try voltaren gel for pain  Will r/o arthritis versus effusion of the left hip with x-ray

## 2024-08-08 NOTE — PROGRESS NOTES
Assessment/Plan   ASSESSMENT/PLAN:      Patient Instructions   Order Pt for evaluation and management of both hip and hand pain  Try voltaren gel for pain  Will r/o arthritis versus effusion of the left hip with x-ray    Christiana Edmond DO     FUTURE DIRECTION:     Subjective   SUBJECTIVE:     Patient ID: Kristie Hunt is a 33 y.o. femalefor the following:    Hand Pain   Started during pregnancy a couple years ago  Reoccurring 3 month ago, described as Tingling and numbness in first two fingers that radiate up arm causing weakness. Also mention neck pain   No improvement with wrist splint     Joint pain   Also mentions diffuse joint pain   Has been working with neurologist     Left hip pain   Initially better with ambulation   Ongoing for the past month     Review of Systems    Allergies   Allergen Reactions    Iothalamic Acid Other    Levamisole Other    Metoclopramide Other     Reglan    Promethazine Unknown     Phenergan         Current Outpatient Medications:     clonazePAM (KlonoPIN) 0.5 mg tablet, Take 1 tablet (0.5 mg) by mouth 3 times a day as needed for anxiety., Disp: , Rfl:     metoprolol tartrate (Lopressor) 25 mg tablet, Take 0.5 tablets (12.5 mg) by mouth 2 times a day., Disp: , Rfl:     ondansetron ODT (Zofran-ODT) 8 mg disintegrating tablet, Take 1 tablet (8 mg) by mouth every 8 hours if needed., Disp: , Rfl:     Syprine 250 mg capsule, Take 3 capsules (750 mg) by mouth 2 times a day., Disp: , Rfl:     valACYclovir (Valtrex) 500 mg tablet, Take 1 tablet (500 mg) by mouth every 12 hours., Disp: , Rfl:     diclofenac sodium (Voltaren) 1 % gel, Apply 4.5 inches (4 g) topically 4 times a day., Disp: 100 g, Rfl: 1     Patient Active Problem List   Diagnosis    Kulwinder's disease (Multi)    Pre-eclampsia, antepartum (HHS-HCC)    Genital HSV    Gastroesophageal reflux disease without esophagitis    Cirrhosis (Multi)    Atrial fibrillation, chronic (Multi)    Anxiety    Essential hypertension     Migraine    PCOS (polycystic ovarian syndrome)    Seizure (Multi)       Social History     Socioeconomic History    Marital status:      Spouse name: Not on file    Number of children: Not on file    Years of education: Not on file    Highest education level: Not on file   Occupational History    Not on file   Tobacco Use    Smoking status: Former     Current packs/day: 0.00     Types: Cigarettes     Quit date:      Years since quittin.6    Smokeless tobacco: Never   Vaping Use    Vaping status: Never Used   Substance and Sexual Activity    Alcohol use: Yes     Alcohol/week: 2.0 standard drinks of alcohol     Types: 2 Standard drinks or equivalent per week    Drug use: Yes     Types: Marijuana    Sexual activity: Not on file   Other Topics Concern    Not on file   Social History Narrative    Not on file     Social Determinants of Health     Financial Resource Strain: Not on file   Food Insecurity: Not on file   Transportation Needs: Not on file   Physical Activity: Not on file   Stress: Not on file   Social Connections: Not on file   Intimate Partner Violence: Not on file       Objective   OBJECTIVE:     Vitals:    24 1020   BP: 102/70   Pulse: 88   Temp: 36.7 °C (98 °F)   SpO2: 97%       Exam      Physical Exam  Constitutional:       Appearance: Normal appearance.   HENT:      Head: Normocephalic.   Pulmonary:      Effort: Pulmonary effort is normal.   Musculoskeletal:      Cervical back: Normal range of motion.      Left hip: Tenderness present. Normal range of motion. Normal strength.      Comments: BL hand with 5/5 strength, decreased sensation of left hand, + Tinels   Neurological:      Mental Status: She is alert.   Psychiatric:         Mood and Affect: Mood normal.

## 2024-08-21 ENCOUNTER — HOSPITAL ENCOUNTER (OUTPATIENT)
Dept: RADIOLOGY | Facility: CLINIC | Age: 34
Discharge: HOME | End: 2024-08-21
Payer: COMMERCIAL

## 2024-08-21 DIAGNOSIS — K74.60 UNSPECIFIED CIRRHOSIS OF LIVER (MULTI): ICD-10-CM

## 2024-08-21 DIAGNOSIS — K76.9 LIVER DISEASE, UNSPECIFIED: ICD-10-CM

## 2024-08-21 PROCEDURE — 74183 MRI ABD W/O CNTR FLWD CNTR: CPT

## 2024-08-21 PROCEDURE — A9575 INJ GADOTERATE MEGLUMI 0.1ML: HCPCS

## 2024-08-21 PROCEDURE — 2550000001 HC RX 255 CONTRASTS

## 2024-08-21 PROCEDURE — 74183 MRI ABD W/O CNTR FLWD CNTR: CPT | Performed by: RADIOLOGY

## 2024-08-21 RX ORDER — GADOTERATE MEGLUMINE 376.9 MG/ML
0.2 INJECTION INTRAVENOUS
Status: COMPLETED | OUTPATIENT
Start: 2024-08-21 | End: 2024-08-21

## 2024-09-09 ENCOUNTER — LAB (OUTPATIENT)
Dept: LAB | Facility: LAB | Age: 34
End: 2024-09-09
Payer: COMMERCIAL

## 2024-09-09 ENCOUNTER — HOSPITAL ENCOUNTER (OUTPATIENT)
Dept: RADIOLOGY | Facility: CLINIC | Age: 34
Discharge: HOME | End: 2024-09-09
Payer: COMMERCIAL

## 2024-09-09 DIAGNOSIS — K74.60 UNSPECIFIED CIRRHOSIS OF LIVER (MULTI): Primary | ICD-10-CM

## 2024-09-09 DIAGNOSIS — M25.552 PAIN OF LEFT HIP: ICD-10-CM

## 2024-09-09 DIAGNOSIS — J04.0 ACUTE LARYNGITIS: ICD-10-CM

## 2024-09-09 LAB
ALBUMIN SERPL BCP-MCNC: 4.2 G/DL (ref 3.4–5)
ALP SERPL-CCNC: 76 U/L (ref 33–110)
ALT SERPL W P-5'-P-CCNC: 24 U/L (ref 7–45)
ANION GAP SERPL CALC-SCNC: 9 MMOL/L (ref 10–20)
AST SERPL W P-5'-P-CCNC: 18 U/L (ref 9–39)
BILIRUB SERPL-MCNC: 0.4 MG/DL (ref 0–1.2)
BUN SERPL-MCNC: 11 MG/DL (ref 6–23)
CALCIUM SERPL-MCNC: 8.7 MG/DL (ref 8.6–10.3)
CHLORIDE SERPL-SCNC: 106 MMOL/L (ref 98–107)
CO2 SERPL-SCNC: 26 MMOL/L (ref 21–32)
CREAT SERPL-MCNC: 0.63 MG/DL (ref 0.5–1.05)
EGFRCR SERPLBLD CKD-EPI 2021: >90 ML/MIN/1.73M*2
ERYTHROCYTE [DISTWIDTH] IN BLOOD BY AUTOMATED COUNT: 14.5 % (ref 11.5–14.5)
GLUCOSE SERPL-MCNC: 94 MG/DL (ref 74–99)
HCT VFR BLD AUTO: 41.3 % (ref 36–46)
HGB BLD-MCNC: 13.6 G/DL (ref 12–16)
INR PPP: 1.2 (ref 0.9–1.1)
MCH RBC QN AUTO: 28.1 PG (ref 26–34)
MCHC RBC AUTO-ENTMCNC: 32.9 G/DL (ref 32–36)
MCV RBC AUTO: 85 FL (ref 80–100)
NRBC BLD-RTO: 0 /100 WBCS (ref 0–0)
PLATELET # BLD AUTO: 199 X10*3/UL (ref 150–450)
POTASSIUM SERPL-SCNC: 3.3 MMOL/L (ref 3.5–5.3)
PROT SERPL-MCNC: 7 G/DL (ref 6.4–8.2)
PROTHROMBIN TIME: 13.5 SECONDS (ref 9.8–12.8)
RBC # BLD AUTO: 4.84 X10*6/UL (ref 4–5.2)
SODIUM SERPL-SCNC: 138 MMOL/L (ref 136–145)
WBC # BLD AUTO: 6.5 X10*3/UL (ref 4.4–11.3)

## 2024-09-09 PROCEDURE — 73502 X-RAY EXAM HIP UNI 2-3 VIEWS: CPT | Mod: LEFT SIDE | Performed by: RADIOLOGY

## 2024-09-09 PROCEDURE — 85027 COMPLETE CBC AUTOMATED: CPT

## 2024-09-09 PROCEDURE — 80053 COMPREHEN METABOLIC PANEL: CPT

## 2024-09-09 PROCEDURE — 82105 ALPHA-FETOPROTEIN SERUM: CPT

## 2024-09-09 PROCEDURE — 85610 PROTHROMBIN TIME: CPT

## 2024-09-09 PROCEDURE — 36415 COLL VENOUS BLD VENIPUNCTURE: CPT

## 2024-09-09 PROCEDURE — 73502 X-RAY EXAM HIP UNI 2-3 VIEWS: CPT | Mod: LT

## 2024-09-10 LAB — AFP SERPL-MCNC: <4 NG/ML (ref 0–9)

## 2024-09-11 ENCOUNTER — APPOINTMENT (OUTPATIENT)
Dept: PRIMARY CARE | Facility: CLINIC | Age: 34
End: 2024-09-11
Payer: COMMERCIAL

## 2024-09-16 ENCOUNTER — APPOINTMENT (OUTPATIENT)
Dept: PRIMARY CARE | Facility: CLINIC | Age: 34
End: 2024-09-16
Payer: COMMERCIAL

## 2024-09-16 VITALS
SYSTOLIC BLOOD PRESSURE: 98 MMHG | OXYGEN SATURATION: 98 % | DIASTOLIC BLOOD PRESSURE: 60 MMHG | BODY MASS INDEX: 24.55 KG/M2 | TEMPERATURE: 97.7 F | WEIGHT: 143 LBS | HEART RATE: 94 BPM

## 2024-09-16 DIAGNOSIS — M25.552 PAIN OF LEFT HIP: Primary | ICD-10-CM

## 2024-09-16 DIAGNOSIS — E83.01 WILSON'S DISEASE (MULTI): ICD-10-CM

## 2024-09-16 PROCEDURE — 3074F SYST BP LT 130 MM HG: CPT | Performed by: PHYSICIAN ASSISTANT

## 2024-09-16 PROCEDURE — 3078F DIAST BP <80 MM HG: CPT | Performed by: PHYSICIAN ASSISTANT

## 2024-09-16 PROCEDURE — 99213 OFFICE O/P EST LOW 20 MIN: CPT | Performed by: PHYSICIAN ASSISTANT

## 2024-09-16 PROCEDURE — 1036F TOBACCO NON-USER: CPT | Performed by: PHYSICIAN ASSISTANT

## 2024-09-16 RX ORDER — ONDANSETRON 4 MG/1
4 TABLET, ORALLY DISINTEGRATING ORAL EVERY 8 HOURS PRN
COMMUNITY
Start: 2024-08-16

## 2024-09-16 NOTE — PROGRESS NOTES
Subjective   Patient ID: Kristie Hunt is a 33 y.o. female who presents for Hip Pain (Recheck L hip pain x1 month. NKI. Discuss hip xray ).    HPI   Patient complains of increasing left hip pain over the past 2 months. Started after she was cleaning her house but no specific injury.  Unremarkable xray 24. Worsens when goes to stand up and walk, or going up stairs.  Worsens if she tries to sit on the floor don-cross.     Has had intermittent pains in wrists or knees over the years, but those have been ok lately.   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   As a result avoids NSAIDs and Tylenol.     Review of Systems      Past Medical History:   Diagnosis Date    Abnormal findings on diagnostic imaging of liver and biliary tract     Abnormal ultrasound of gallbladder    Calculus of gallbladder without cholecystitis without obstruction 2015    Gall stones    Other specified postprocedural states     History of endoscopy    Personal history of other diseases of the circulatory system 2022    History of atrial fibrillation    Personal history of other diseases of the respiratory system 2018    History of influenza    Personal history of other medical treatment     History of HIDA scan    Unspecified convulsions (Multi) 2016    Seizure    Kulwinder's disease (Multi)       Family History   Problem Relation Name Age of Onset    Heart disease Mother      Other (dyslipdemia) Father      Hypertension Father        Social History     Tobacco Use    Smoking status: Former     Current packs/day: 0.00     Types: Cigarettes     Quit date:      Years since quittin.7    Smokeless tobacco: Never   Vaping Use    Vaping status: Never Used   Substance Use Topics    Alcohol use: Yes     Alcohol/week: 2.0 standard drinks of alcohol     Types: 2 Standard drinks or equivalent per week    Drug use: Yes     Types: Marijuana        Objective   BP 98/60   Pulse 94   Temp 36.5 °C (97.7 °F)   Wt 64.9 kg (143  lb)   SpO2 98%   BMI 24.55 kg/m²     Physical Exam  Vitals and nursing note reviewed.   Constitutional:       Appearance: Normal appearance. She is well-developed.   Eyes:      General: No scleral icterus.  Cardiovascular:      Rate and Rhythm: Normal rate and regular rhythm.   Pulmonary:      Effort: Pulmonary effort is normal.      Breath sounds: Normal breath sounds.   Abdominal:      Palpations: Abdomen is soft. There is no mass.      Tenderness: There is no abdominal tenderness.   Musculoskeletal:      Cervical back: Neck supple.      Comments: Left hip with mild tenderness anteriorly.    Skin:     General: Skin is warm and dry.   Neurological:      Mental Status: She is alert.         Assessment/Plan   Diagnoses and all orders for this visit:  Pain of left hip  -     Referral to Orthopaedic Surgery; Future  Kulwinder's disease (Multi)       Reviewed xray results with patient.   Referred to orthopedic.   Discussed role that her Kulwinder's may be playing in her hip pain (and other intermittent joint pains).   Try using OTC Voltaren gel.   Use heating pad.   Follow up prn.

## 2024-09-23 ENCOUNTER — APPOINTMENT (OUTPATIENT)
Dept: PRIMARY CARE | Facility: CLINIC | Age: 34
End: 2024-09-23
Payer: COMMERCIAL

## 2024-10-16 ENCOUNTER — APPOINTMENT (OUTPATIENT)
Dept: ORTHOPEDIC SURGERY | Facility: CLINIC | Age: 34
End: 2024-10-16
Payer: COMMERCIAL

## 2024-10-16 ENCOUNTER — OFFICE VISIT (OUTPATIENT)
Dept: ORTHOPEDIC SURGERY | Age: 34
End: 2024-10-16
Payer: COMMERCIAL

## 2024-10-16 VITALS — HEIGHT: 63 IN | WEIGHT: 138 LBS | BODY MASS INDEX: 24.45 KG/M2

## 2024-10-16 DIAGNOSIS — M25.552 PAIN OF LEFT HIP: ICD-10-CM

## 2024-10-16 PROCEDURE — 1036F TOBACCO NON-USER: CPT | Performed by: STUDENT IN AN ORGANIZED HEALTH CARE EDUCATION/TRAINING PROGRAM

## 2024-10-16 PROCEDURE — 3008F BODY MASS INDEX DOCD: CPT | Performed by: STUDENT IN AN ORGANIZED HEALTH CARE EDUCATION/TRAINING PROGRAM

## 2024-10-16 PROCEDURE — 99204 OFFICE O/P NEW MOD 45 MIN: CPT | Performed by: STUDENT IN AN ORGANIZED HEALTH CARE EDUCATION/TRAINING PROGRAM

## 2024-10-16 NOTE — PROGRESS NOTES
PRIMARY CARE PHYSICIAN: Christiana Edmond DO  REFERRING PROVIDER: Shravan Mondragon PA-C  [address]  [place] [id number]     CONSULT ORTHOPAEDIC: Hip Evaluation    ASSESSMENT & PLAN    Impression: 33 y.o. female with left hip early osteoarthritis.    Plan:   I explained to the patient the nature of their diagnosis.  I reviewed their imaging studies with them.    Based on the history, physical exam and imaging studies above, the patient's presentation is consistent with the above diagnosis.  I had a long discussion with the patient regarding their presentation and the treatment options.  We discussed initial nonoperative versus operative management options as well as potential further diagnostic imaging.  I reviewed the patient's x-ray findings with her.  She has early osteoarthritis/degenerative changes of the femoral acetabular joint.  We discussed initial nonoperative management options.  I provided her with a referral to my colleague for ultrasound-guided left hip corticosteroid injection.  She will focus on low impact activities and continue to practice good weight management.  She will ice the hip as needed.  She will return to see me as needed.    Follow-Up: Patient will follow-up as needed    At the end of the visit, all questions were answered in full. The patient is in agreement with the plan and recommendations. They will call the office with any questions/concerns.    Note dictated with [employer] software. Completed without full typed error editing and sent to avoid delay.       SUBJECTIVE  CHIEF COMPLAINT: Left hip pain    HPI: Kristie Hunt is a 33 y.o. patient who presents today with left hip pain. X-rays done on 8/8/24. She states he pain has been going on for 3 months. She states she was cleaning her house one day and then started having pain. No known injury. She states he pain has been worsening over the last 3 months. Prolonged sitting and walking seems  to make her pain worse. She also reports whole leg pain due to the way she has been walking. No previous hip surgeries or injections.     They deny any constant or progressive numbness or tingling in their legs.     REVIEW OF SYSTEMS  Constitutional: See HPI for pain assessment, No significant weight loss, recent trauma  Cardiovascular: No chest pain, shortness of breath  Respiratory: No difficulty breathing, cough  Gastrointestinal: No nausea, vomiting, diarrhea, constipation  Musculoskeletal: Noted in HPI, positive for pain, restricted motion, stiffness  Integumentary: No rashes, easy bruising, redness   Neurological: no numbness or tingling in extremities, no gait disturbances   Psychiatric: No mood changes, memory changes, social issues  Heme/Lymph: no excessive swelling, easy bruising, excessive bleeding  ENT: No hearing changes  Eyes: No vision changes    Past Medical History:   Diagnosis Date    Abnormal findings on diagnostic imaging of liver and biliary tract     Abnormal ultrasound of gallbladder    Calculus of gallbladder without cholecystitis without obstruction 06/18/2015    Gall stones    Other specified postprocedural states     History of endoscopy    Personal history of other diseases of the circulatory system 06/29/2022    History of atrial fibrillation    Personal history of other diseases of the respiratory system 02/20/2018    History of influenza    Personal history of other medical treatment     History of HIDA scan    Unspecified convulsions (Multi) 06/21/2016    Seizure    Kulwinder's disease (Multi)         Allergies   Allergen Reactions    Iothalamic Acid Other    Levamisole Other    Metoclopramide Other     Reglan    Promethazine Unknown     Phenergan        Past Surgical History:   Procedure Laterality Date    BREAST SURGERY  06/21/2016    Breast Surgery    CHOLECYSTECTOMY  06/29/2022    Cholecystectomy Laparoscopic    DILATION AND CURETTAGE OF UTERUS  06/21/2016    Dilation And Curettage     MOUTH SURGERY  2022    Oral Surgery Tooth Extraction    MR HEAD ANGIO WO IV CONTRAST  10/10/2022    MR HEAD ANGIO WO IV CONTRAST 10/10/2022 [facility]    OTHER SURGICAL HISTORY  2022    Biopsy Of Liver    OTHER SURGICAL HISTORY  2022     section    TONSILLECTOMY  2022    Tonsillectomy With Adenoidectomy    US GUIDED NEEDLE LIVER BIOPSY  2013    US GUIDED NEEDLE LIVER BIOPSY 2013 [facility]        Family History   Problem Relation Name Age of Onset    Heart disease Mother      Other (dyslipdemia) Father      Hypertension Father          Social History     Socioeconomic History    Marital status:      Spouse name: Not on file    Number of children: Not on file    Years of education: Not on file    Highest education level: Not on file   Occupational History    Not on file   Tobacco Use    Smoking status: Former     Current packs/day: 0.00     Types: Cigarettes     Quit date:      Years since quittin.7    Smokeless tobacco: Never   Vaping Use    Vaping status: Never Used   Substance and Sexual Activity    Alcohol use: Yes     Alcohol/week: 2.0 standard drinks of alcohol     Types: 2 Standard drinks or equivalent per week    Drug use: Yes     Types: Marijuana    Sexual activity: Not on file   Other Topics Concern    Not on file   Social History Narrative    Not on file     Social Drivers of Health     Financial Resource Strain: Not on file   Food Insecurity: Not on file   Transportation Needs: Not on file   Physical Activity: Not on file   Stress: Not on file   Social Connections: Not on file   Intimate Partner Violence: Not on file   Housing Stability: Not on file        CURRENT MEDICATIONS:   Current Outpatient Medications   Medication Sig Dispense Refill    clonazePAM (KlonoPIN) 0.5 mg tablet Take 1 tablet (0.5 mg) by mouth 3 times a day as needed for anxiety.      diclofenac sodium (Voltaren) 1 % gel Apply 4.5 inches (4 g) topically 4 times a  "day. 100 g 1    metoprolol tartrate (Lopressor) 25 mg tablet Take 0.5 tablets (12.5 mg) by mouth 2 times a day.      ondansetron ODT (Zofran-ODT) 4 mg disintegrating tablet Take 1 tablet (4 mg) by mouth every 8 hours if needed for nausea or vomiting.      Syprine 250 mg capsule Take 3 capsules (750 mg) by mouth 2 times a day.      valACYclovir (Valtrex) 500 mg tablet Take 1 tablet (500 mg) by mouth every 12 hours.       No current facility-administered medications for this visit.        OBJECTIVE    PHYSICAL EXAM      4/18/2024    11:33 AM 4/24/2024     3:28 PM 5/23/2024    11:58 AM 7/11/2024    10:05 AM 8/8/2024    10:20 AM 8/21/2024    10:07 AM 9/16/2024    11:30 AM   Vitals   Systolic  118 106 102 102  98   Diastolic  80 71 64 70  60   Heart Rate  96 70  88  94   Temp  36.4 °C (97.5 °F) 36.5 °C (97.7 °F) 36.3 °C (97.3 °F) 36.7 °C (98 °F)  36.5 °C (97.7 °F)   Resp   16       Height (in) 1.626 m (5' 4\")         Weight (lb) 136.69   137 138 138 143   BMI 23.46 kg/m2   23.52 kg/m2 23.69 kg/m2 23.69 kg/m2 24.55 kg/m2   BSA (m2) 1.67 m2   1.67 m2 1.68 m2 1.68 m2 1.71 m2   Visit Report  Report  Report Report  Report      There is no height or weight on file to calculate BMI.    GENERAL: A/Ox3, NAD. Appears healthy, well nourished  PSYCHIATRIC: Mood stable, appropriate memory recall  EYES: EOM intact, no scleral icterus  CARDIOVASCULAR: Palpable peripheral pulses  LUNGS: Breathing non-labored on room air  SKIN: no erythema, rashes, or ecchymoses     MUSCULOSKELETAL:  Laterality: left Hip Exam  - Skin intact  - No erythema or warmth  - No ecchymosis or soft tissue swelling  - Alignment: neutral  - Palpation: Positive mild tenderness of the left iliopsoas  - ROM: Forward flexion greater than 90, IR 20, ER 30  - Strength:      - Iliopsoas 4+/5, Hip abduction and adduction 4+/5 with pain     - knee extension and flexion 5/5, EHL/PF/DF motor intact  - Stability:        Hyperextension external rotation test equivocal  - " positive FADIR  - positive SARA  - Gait: Antalgic/coxalgic    NEUROVASCULAR:  - Neurovascular Status: sensation intact to light touch distally, lower extremity motor intact  - Capillary refill brisk at extremities, Bilateral dorsalis pedis pulse 2+    Imaging: Multiple views of the affected left hip(s) demonstrate: Early degenerative changes of the femoral acetabular joint without acute osseous abnormality.   X-rays were personally reviewed and interpreted by me.  Radiology reports were reviewed by me as well, if readily available at the time.      Easton Gale MD  Attending Surgeon    Sports Medicine Orthopaedic Surgery  [facility] [place]  [facility] [employer]

## 2024-10-28 ENCOUNTER — HOSPITAL ENCOUNTER (OUTPATIENT)
Dept: RADIOLOGY | Facility: EXTERNAL LOCATION | Age: 34
Discharge: HOME | End: 2024-10-28

## 2024-10-28 ENCOUNTER — APPOINTMENT (OUTPATIENT)
Dept: ORTHOPEDIC SURGERY | Age: 34
End: 2024-10-28
Payer: COMMERCIAL

## 2024-10-28 VITALS — BODY MASS INDEX: 24.45 KG/M2 | WEIGHT: 138 LBS | HEIGHT: 63 IN

## 2024-10-28 DIAGNOSIS — M16.7: Primary | ICD-10-CM

## 2024-10-28 DIAGNOSIS — M25.552 PAIN OF LEFT HIP: ICD-10-CM

## 2024-10-28 PROCEDURE — 99204 OFFICE O/P NEW MOD 45 MIN: CPT | Performed by: EMERGENCY MEDICINE

## 2024-10-28 PROCEDURE — 20611 DRAIN/INJ JOINT/BURSA W/US: CPT | Performed by: EMERGENCY MEDICINE

## 2024-10-28 PROCEDURE — 3008F BODY MASS INDEX DOCD: CPT | Performed by: EMERGENCY MEDICINE

## 2024-10-28 RX ORDER — LIDOCAINE HYDROCHLORIDE 10 MG/ML
4 INJECTION, SOLUTION INFILTRATION; PERINEURAL
Status: COMPLETED | OUTPATIENT
Start: 2024-10-28 | End: 2024-10-28

## 2024-10-28 RX ORDER — METHYLPREDNISOLONE ACETATE 40 MG/ML
80 INJECTION, SUSPENSION INTRA-ARTICULAR; INTRALESIONAL; INTRAMUSCULAR; SOFT TISSUE
Status: COMPLETED | OUTPATIENT
Start: 2024-10-28 | End: 2024-10-28

## 2024-11-05 ENCOUNTER — LAB (OUTPATIENT)
Dept: LAB | Facility: LAB | Age: 34
End: 2024-11-05
Payer: COMMERCIAL

## 2024-11-05 ENCOUNTER — APPOINTMENT (OUTPATIENT)
Dept: CARDIOLOGY | Facility: CLINIC | Age: 34
End: 2024-11-05
Payer: COMMERCIAL

## 2024-11-05 VITALS
DIASTOLIC BLOOD PRESSURE: 68 MMHG | HEART RATE: 94 BPM | HEIGHT: 63 IN | SYSTOLIC BLOOD PRESSURE: 116 MMHG | WEIGHT: 138 LBS | BODY MASS INDEX: 24.45 KG/M2

## 2024-11-05 DIAGNOSIS — I48.20 ATRIAL FIBRILLATION, CHRONIC (MULTI): ICD-10-CM

## 2024-11-05 DIAGNOSIS — E83.01 WILSON'S DISEASE (MULTI): ICD-10-CM

## 2024-11-05 DIAGNOSIS — I48.20 ATRIAL FIBRILLATION, CHRONIC (MULTI): Primary | ICD-10-CM

## 2024-11-05 LAB
ANION GAP SERPL CALC-SCNC: 8 MMOL/L (ref 10–20)
BUN SERPL-MCNC: 13 MG/DL (ref 6–23)
CALCIUM SERPL-MCNC: 8.8 MG/DL (ref 8.6–10.3)
CHLORIDE SERPL-SCNC: 106 MMOL/L (ref 98–107)
CO2 SERPL-SCNC: 28 MMOL/L (ref 21–32)
CREAT SERPL-MCNC: 0.58 MG/DL (ref 0.5–1.05)
EGFRCR SERPLBLD CKD-EPI 2021: >90 ML/MIN/1.73M*2
GLUCOSE SERPL-MCNC: 76 MG/DL (ref 74–99)
MAGNESIUM SERPL-MCNC: 1.75 MG/DL (ref 1.6–2.4)
POTASSIUM SERPL-SCNC: 3.7 MMOL/L (ref 3.5–5.3)
SODIUM SERPL-SCNC: 138 MMOL/L (ref 136–145)

## 2024-11-05 PROCEDURE — 80048 BASIC METABOLIC PNL TOTAL CA: CPT

## 2024-11-05 PROCEDURE — 3074F SYST BP LT 130 MM HG: CPT | Performed by: INTERNAL MEDICINE

## 2024-11-05 PROCEDURE — 83735 ASSAY OF MAGNESIUM: CPT

## 2024-11-05 PROCEDURE — 3008F BODY MASS INDEX DOCD: CPT | Performed by: INTERNAL MEDICINE

## 2024-11-05 PROCEDURE — 3078F DIAST BP <80 MM HG: CPT | Performed by: INTERNAL MEDICINE

## 2024-11-05 PROCEDURE — 99204 OFFICE O/P NEW MOD 45 MIN: CPT | Performed by: INTERNAL MEDICINE

## 2024-11-05 PROCEDURE — 93000 ELECTROCARDIOGRAM COMPLETE: CPT | Performed by: INTERNAL MEDICINE

## 2024-11-05 PROCEDURE — 36415 COLL VENOUS BLD VENIPUNCTURE: CPT

## 2024-11-05 PROCEDURE — 1036F TOBACCO NON-USER: CPT | Performed by: INTERNAL MEDICINE

## 2024-11-06 ENCOUNTER — TELEPHONE (OUTPATIENT)
Dept: CARDIOLOGY | Facility: HOSPITAL | Age: 34
End: 2024-11-06
Payer: COMMERCIAL

## 2024-11-06 NOTE — TELEPHONE ENCOUNTER
11/8/24  1113  Called lab results and continuation of care directive to patient with patient verbalizing understanding.      11/7/24  1053  Called patient; no answer and no voice mail.  Will try later      11/6/24  1051  Called patient; no answer and no voice mail.  Will try later        ----- Message from Cristi Helms sent at 11/5/2024  5:49 PM EST -----  Please inform the patient that blood work shows normal serum potassium of 3.7 and normal serum magnesium level of 1.75.  No changes current medical care.

## 2024-11-09 ENCOUNTER — OFFICE VISIT (OUTPATIENT)
Dept: URGENT CARE | Age: 34
End: 2024-11-09
Payer: COMMERCIAL

## 2024-11-09 ENCOUNTER — ANCILLARY PROCEDURE (OUTPATIENT)
Dept: URGENT CARE | Age: 34
End: 2024-11-09
Payer: COMMERCIAL

## 2024-11-09 VITALS
SYSTOLIC BLOOD PRESSURE: 110 MMHG | DIASTOLIC BLOOD PRESSURE: 54 MMHG | TEMPERATURE: 98 F | HEART RATE: 71 BPM | OXYGEN SATURATION: 98 % | RESPIRATION RATE: 18 BRPM

## 2024-11-09 DIAGNOSIS — S39.92XA TAILBONE INJURY, INITIAL ENCOUNTER: ICD-10-CM

## 2024-11-09 DIAGNOSIS — S30.0XXA COCCYX CONTUSION, INITIAL ENCOUNTER: Primary | ICD-10-CM

## 2024-11-09 PROCEDURE — 3074F SYST BP LT 130 MM HG: CPT | Performed by: PHYSICIAN ASSISTANT

## 2024-11-09 PROCEDURE — 1036F TOBACCO NON-USER: CPT | Performed by: PHYSICIAN ASSISTANT

## 2024-11-09 PROCEDURE — 72220 X-RAY EXAM SACRUM TAILBONE: CPT | Performed by: PHYSICIAN ASSISTANT

## 2024-11-09 PROCEDURE — 3078F DIAST BP <80 MM HG: CPT | Performed by: PHYSICIAN ASSISTANT

## 2024-11-09 PROCEDURE — 99213 OFFICE O/P EST LOW 20 MIN: CPT | Performed by: PHYSICIAN ASSISTANT

## 2024-11-10 ASSESSMENT — ENCOUNTER SYMPTOMS
CONSTITUTIONAL NEGATIVE: 1
RECTAL PAIN: 0
BLOOD IN STOOL: 0

## 2024-11-11 NOTE — PROGRESS NOTES
Subjective   Patient ID: Kristie Hunt is a 34 y.o. female. They present today with a chief complaint of Tailbone Injury ( Fell last PM).    History of Present Illness  Injury to the tailbone last night.  She attempted to sit down on a curb and landed hard on her tailbone.  Denies other injury      History provided by:  Patient   used: No        Past Medical History  Allergies as of 2024 - Reviewed 2024   Allergen Reaction Noted    Iothalamic acid Other 2024    Levamisole Other 2024    Metoclopramide Other 2023    Promethazine Unknown 2023       (Not in a hospital admission)       Past Medical History:   Diagnosis Date    Abnormal findings on diagnostic imaging of liver and biliary tract     Abnormal ultrasound of gallbladder    Calculus of gallbladder without cholecystitis without obstruction 2015    Gall stones    Other specified postprocedural states     History of endoscopy    Personal history of other diseases of the circulatory system 2022    History of atrial fibrillation    Personal history of other diseases of the respiratory system 2018    History of influenza    Personal history of other medical treatment     History of HIDA scan    Unspecified convulsions (Multi) 2016    Seizure    Kulwinder's disease (Multi)        Past Surgical History:   Procedure Laterality Date    BREAST SURGERY  2016    Breast Surgery    CHOLECYSTECTOMY  2022    Cholecystectomy Laparoscopic    DILATION AND CURETTAGE OF UTERUS  2016    Dilation And Curettage    MOUTH SURGERY  2022    Oral Surgery Tooth Extraction    MR HEAD ANGIO WO IV CONTRAST  10/10/2022    MR HEAD ANGIO WO IV CONTRAST 10/10/2022 [facility]    OTHER SURGICAL HISTORY  2022    Biopsy Of Liver    OTHER SURGICAL HISTORY  2022     section    TONSILLECTOMY  2022    Tonsillectomy With Adenoidectomy    US GUIDED NEEDLE LIVER BIOPSY  2013     US GUIDED NEEDLE LIVER BIOPSY 7/9/2013 [facility]        reports that she quit smoking about 11 years ago. Her smoking use included cigarettes. She has never used smokeless tobacco. She reports current alcohol use of about 2.0 standard drinks of alcohol per week. She reports current drug use. Drug: Marijuana.    Review of Systems  Review of Systems   Constitutional: Negative.    Gastrointestinal:  Negative for blood in stool and rectal pain.   Musculoskeletal:         Pain in tailbone                                  Objective    Vitals:    11/09/24 0857   BP: 110/54   Pulse: 71   Resp: 18   Temp: 36.7 °C (98 °F)   SpO2: 98%     No LMP recorded.    Physical Exam  Constitutional:       General: She is not in acute distress.     Appearance: Normal appearance. She is normal weight.   Musculoskeletal:      Comments: Coccyx area is tender to palpation.  No palpable bony defect. Skin intact, no swelling   Skin:     General: Skin is warm and dry.      Findings: No bruising.   Neurological:      Mental Status: She is alert.         Procedures    Point of Care Test & Imaging Results from this visit  No results found for this visit on 11/09/24.   XR sacrum coccyx 2+ views    Result Date: 11/9/2024  Interpreted By:  Anthony Quijano, STUDY: XR SACRUM COCCYX 2+ VIEWS; ;  11/9/2024 9:29 am   INDICATION: Signs/Symptoms:fell last night, tailbone pain.   ,S39.92XA Unspecified injury of lower back, initial encounter   COMPARISON: None.   ACCESSION NUMBER(S): [id number]   ORDERING CLINICIAN: RUDDY TURNER   FINDINGS: Sacroiliac joints appear symmetric. There is no diastasis of the pubic symphysis. There is no evidence of an acute fracture. No suspicious lytic or blastic lesions.       No evidence of an acute fracture.     MACRO: None   Signed by: Anthony Quijano 11/9/2024 10:08 AM Dictation workstation:   [id number]     Diagnostic study results (if any) were reviewed by Ruddy Turner PA-C.    Assessment/Plan    Allergies, medications, history, and pertinent labs/EKGs/Imaging reviewed by Jackeline Turner PA-C.     Medical Decision Making  Xray of coccyx was neg for fx.  Advised OTC pain medication, she will purchase a donut to sit on from pharmacy, advised stool softners.  To ER for severe pain, or other concerning sx.      Orders and Diagnoses  There are no diagnoses linked to this encounter.    Medical Admin Record      Patient disposition: Home    Electronically signed by Jackeline Turner PA-C  9:23 PM

## 2024-11-22 ENCOUNTER — LAB (OUTPATIENT)
Dept: LAB | Facility: LAB | Age: 34
End: 2024-11-22
Payer: COMMERCIAL

## 2024-11-22 DIAGNOSIS — E83.01 WILSON'S DISEASE (MULTI): ICD-10-CM

## 2024-11-22 DIAGNOSIS — K74.60 UNSPECIFIED CIRRHOSIS OF LIVER (MULTI): Primary | ICD-10-CM

## 2024-11-22 PROCEDURE — 82525 ASSAY OF COPPER: CPT

## 2024-11-25 ENCOUNTER — APPOINTMENT (OUTPATIENT)
Dept: ORTHOPEDIC SURGERY | Age: 34
End: 2024-11-25
Payer: COMMERCIAL

## 2024-11-25 VITALS — WEIGHT: 138 LBS | BODY MASS INDEX: 24.45 KG/M2 | HEIGHT: 63 IN

## 2024-11-25 DIAGNOSIS — M16.7: Primary | ICD-10-CM

## 2024-11-25 PROCEDURE — G2211 COMPLEX E/M VISIT ADD ON: HCPCS | Performed by: EMERGENCY MEDICINE

## 2024-11-25 PROCEDURE — 99213 OFFICE O/P EST LOW 20 MIN: CPT | Performed by: EMERGENCY MEDICINE

## 2024-11-25 PROCEDURE — 3008F BODY MASS INDEX DOCD: CPT | Performed by: EMERGENCY MEDICINE

## 2024-11-25 NOTE — PROGRESS NOTES
Subjective    Patient ID: Kristie Hunt is a 34 y.o. female.    Chief Complaint: Pain of the Left Hip (Injection 10/28/24 states it has helped, but with in the last week she has noticed the pain starting to come back. Not as intense as it was.)     Last Surgery: No surgery found  Last Surgery Date: No surgery found    Kristie is a very pleasant 33-year-old female with a history of Kulwinder's disease coming in with some early left hip osteoarthritis.  She was referred here by Dr. Gale for a possible left hip cortisone injection under ultrasound guidance.  She states that she has been having anterior lateral hip pain for several months now that is getting worse with activity.  She does not take much Tylenol because of her cirrhosis and history of Kulwinder's.  She takes ibuprofen occasionally and also soaks in warm baths which seems to help.  She has not had prior surgeries.  No reported traumatic events or known injuries.  She is trying to avoid surgery if at all possible. We agreed to perform a left hip cortisone injection under ultrasound guidance.  The patient tolerated the procedure well without any complications and activity modifications were reviewed.  We also agreed to send her to physical therapy with an emphasis on her developing and implementing a home exercise program.  She will then follow-up with me in about 4 weeks to determine her response to this plan.  Of note, we also discussed potentially doing gel/PRP injections in the future.     Update on 11/25/2024.  Patient is coming back in for a follow-up visit for her acute on chronic left hip pain after a cortisone injection that we did under ultrasound guidance on 10/28/2024.  She states that after a few days she began to experience excellent pain relief but for the past week it has started to wear off slightly.  Overall she still thinks that she is getting about 50 to 60% of pain relief.  She denies any traumatic events or known injuries.  She has no  other complaints or today and would like to avoid gel or PRP injections with the holidays coming up saying that it would be out of her budget at this time.        Objective   Right Hip Exam     Tenderness   The patient is experiencing no tenderness.       Left Hip Exam     Tenderness   The patient is experiencing no tenderness.     Range of Motion   Internal rotation: abnormal     Muscle Strength   The patient has normal left hip strength.   Abduction: 5/5   Adduction: 5/5   Flexion: 5/5     Tests   SARA: positive    Other   Erythema: absent  Sensation: normal  Pulse: present    Comments:  No tenderness to palpation over the gluteal tendon insertion near the greater trochanter.  Strength and sensation intact.  Normal gait.            Image Results:  No new imaging    Patient ID: Kristie Hunt is a 34 y.o. female.    Procedures    Assessment/Plan   Encounter Diagnoses:  Secondary osteoarthritis of left hip    No orders of the defined types were placed in this encounter.    No follow-ups on file.      We discussed her treatment options and decided that overall she is still getting some good pain relief from the injection that we did last month.  The effect of the injection seems to be starting to wear off though so we are going to have her follow-up at the 3-month elena in January for a potential repeat injection.  For now we are going to not do PRP or gel injections.  She wants to avoid surgery for as long as possible.    ** Please excuse any errors in grammar or translation related to this dictation. Voice recognition software was utilized to prepare this document. **       Francisco Grant MD  [facility]

## 2024-12-02 LAB
COPPER 24H UR-MRATE: 109 UG/24 HR (ref 3–35)
COPPER UR-MCNC: 91 UG/L
COPPER/CREAT UR: 84 UG/G CREAT (ref 0–49)
CREAT UR-MCNC: 1.08 G/L (ref 0.3–3)

## 2024-12-18 ENCOUNTER — TELEPHONE (OUTPATIENT)
Dept: OBSTETRICS AND GYNECOLOGY | Facility: CLINIC | Age: 34
End: 2024-12-18
Payer: COMMERCIAL

## 2024-12-19 NOTE — TELEPHONE ENCOUNTER
Patient has not been seen in a few years. I explained that we cannot send in refills if not seen within a year

## 2024-12-30 ENCOUNTER — OFFICE VISIT (OUTPATIENT)
Dept: PRIMARY CARE | Facility: CLINIC | Age: 34
End: 2024-12-30
Payer: COMMERCIAL

## 2024-12-30 ENCOUNTER — LAB (OUTPATIENT)
Dept: LAB | Facility: LAB | Age: 34
End: 2024-12-30
Payer: COMMERCIAL

## 2024-12-30 VITALS
DIASTOLIC BLOOD PRESSURE: 68 MMHG | SYSTOLIC BLOOD PRESSURE: 114 MMHG | HEART RATE: 84 BPM | TEMPERATURE: 98 F | OXYGEN SATURATION: 98 %

## 2024-12-30 DIAGNOSIS — R42 DIZZINESS: ICD-10-CM

## 2024-12-30 DIAGNOSIS — R42 DIZZINESS: Primary | ICD-10-CM

## 2024-12-30 DIAGNOSIS — M16.12 PRIMARY OSTEOARTHRITIS OF LEFT HIP: ICD-10-CM

## 2024-12-30 DIAGNOSIS — G44.209 TENSION HEADACHE: ICD-10-CM

## 2024-12-30 DIAGNOSIS — M54.2 NECK PAIN ON LEFT SIDE: ICD-10-CM

## 2024-12-30 LAB
ALBUMIN SERPL BCP-MCNC: 4.1 G/DL (ref 3.4–5)
ALP SERPL-CCNC: 80 U/L (ref 33–110)
ALT SERPL W P-5'-P-CCNC: 19 U/L (ref 7–45)
ANION GAP SERPL CALC-SCNC: 10 MMOL/L (ref 10–20)
AST SERPL W P-5'-P-CCNC: 16 U/L (ref 9–39)
BASOPHILS # BLD AUTO: 0.04 X10*3/UL (ref 0–0.1)
BASOPHILS NFR BLD AUTO: 0.6 %
BILIRUB SERPL-MCNC: 0.7 MG/DL (ref 0–1.2)
BUN SERPL-MCNC: 10 MG/DL (ref 6–23)
CALCIUM SERPL-MCNC: 9.2 MG/DL (ref 8.6–10.3)
CHLORIDE SERPL-SCNC: 104 MMOL/L (ref 98–107)
CO2 SERPL-SCNC: 26 MMOL/L (ref 21–32)
CREAT SERPL-MCNC: 0.66 MG/DL (ref 0.5–1.05)
EGFRCR SERPLBLD CKD-EPI 2021: >90 ML/MIN/1.73M*2
EOSINOPHIL # BLD AUTO: 0.15 X10*3/UL (ref 0–0.7)
EOSINOPHIL NFR BLD AUTO: 2.4 %
ERYTHROCYTE [DISTWIDTH] IN BLOOD BY AUTOMATED COUNT: 13.8 % (ref 11.5–14.5)
GLUCOSE SERPL-MCNC: 82 MG/DL (ref 74–99)
HCT VFR BLD AUTO: 41.1 % (ref 36–46)
HGB BLD-MCNC: 13.3 G/DL (ref 12–16)
IMM GRANULOCYTES # BLD AUTO: 0.03 X10*3/UL (ref 0–0.7)
IMM GRANULOCYTES NFR BLD AUTO: 0.5 % (ref 0–0.9)
LYMPHOCYTES # BLD AUTO: 2.12 X10*3/UL (ref 1.2–4.8)
LYMPHOCYTES NFR BLD AUTO: 34 %
MCH RBC QN AUTO: 27.9 PG (ref 26–34)
MCHC RBC AUTO-ENTMCNC: 32.4 G/DL (ref 32–36)
MCV RBC AUTO: 86 FL (ref 80–100)
MONOCYTES # BLD AUTO: 0.5 X10*3/UL (ref 0.1–1)
MONOCYTES NFR BLD AUTO: 8 %
NEUTROPHILS # BLD AUTO: 3.4 X10*3/UL (ref 1.2–7.7)
NEUTROPHILS NFR BLD AUTO: 54.5 %
NRBC BLD-RTO: 0 /100 WBCS (ref 0–0)
PLATELET # BLD AUTO: 189 X10*3/UL (ref 150–450)
POTASSIUM SERPL-SCNC: 3.5 MMOL/L (ref 3.5–5.3)
PROT SERPL-MCNC: 6.7 G/DL (ref 6.4–8.2)
RBC # BLD AUTO: 4.76 X10*6/UL (ref 4–5.2)
SODIUM SERPL-SCNC: 136 MMOL/L (ref 136–145)
WBC # BLD AUTO: 6.2 X10*3/UL (ref 4.4–11.3)

## 2024-12-30 PROCEDURE — 3078F DIAST BP <80 MM HG: CPT | Performed by: FAMILY MEDICINE

## 2024-12-30 PROCEDURE — 1036F TOBACCO NON-USER: CPT | Performed by: FAMILY MEDICINE

## 2024-12-30 PROCEDURE — 99214 OFFICE O/P EST MOD 30 MIN: CPT | Performed by: FAMILY MEDICINE

## 2024-12-30 PROCEDURE — 85025 COMPLETE CBC W/AUTO DIFF WBC: CPT

## 2024-12-30 PROCEDURE — 80053 COMPREHEN METABOLIC PANEL: CPT

## 2024-12-30 PROCEDURE — 3074F SYST BP LT 130 MM HG: CPT | Performed by: FAMILY MEDICINE

## 2024-12-30 PROCEDURE — 93000 ELECTROCARDIOGRAM COMPLETE: CPT | Performed by: FAMILY MEDICINE

## 2024-12-30 RX ORDER — DICLOFENAC SODIUM 75 MG/1
75 TABLET, DELAYED RELEASE ORAL 2 TIMES DAILY PRN
Qty: 14 TABLET | Refills: 0 | Status: SHIPPED | OUTPATIENT
Start: 2024-12-30 | End: 2025-01-06

## 2024-12-30 ASSESSMENT — ENCOUNTER SYMPTOMS
ARTHRALGIAS: 1
FEVER: 0
NECK PAIN: 1
HEADACHES: 1
PALPITATIONS: 0
SORE THROAT: 0
DIZZINESS: 1
EYE PAIN: 0
COUGH: 0
TROUBLE SWALLOWING: 0
WHEEZING: 0
SHORTNESS OF BREATH: 0
CHILLS: 0

## 2024-12-30 NOTE — PROGRESS NOTES
"Subjective   Patient ID: Kristie Hunt is a 34 y.o. female who presents for Hip Pain (/), Neck Pain (L side of body pain radiating up and down ), Dizziness, Blurred Vision (\"Fuzzy\" vision), and Headache (X 1 month. NKI).    Kristie has not been feeling well. She has noticed that she becomes lightheaded when standing up. Vision will appear blurry for a second then resolves. No episodes of syncope though often feels like she is going to pass out. She has not had any chest pain or palpitations.     She has pain in her left hip. Has known osteoarthritis. Did receive steroid shot from orthopedics but only had short-term relief. She was told her only other option is a hip replacement.     She is also having pain on the left side of her neck. Pain radiates into region of shoulder blade. She has associated headaches with neck pain.          Review of Systems   Constitutional:  Negative for chills and fever.   HENT:  Negative for congestion, sore throat and trouble swallowing.    Eyes:  Positive for visual disturbance. Negative for pain.   Respiratory:  Negative for cough, shortness of breath and wheezing.    Cardiovascular:  Negative for chest pain and palpitations.   Musculoskeletal:  Positive for arthralgias and neck pain.   Neurological:  Positive for dizziness and headaches.       Objective   /68   Pulse 84   Temp 36.7 °C (98 °F)   SpO2 98%     Physical Exam  Constitutional:       General: She is not in acute distress.     Appearance: Normal appearance.   HENT:      Head: Normocephalic.      Mouth/Throat:      Mouth: Mucous membranes are moist.   Eyes:      Extraocular Movements: Extraocular movements intact.      Conjunctiva/sclera: Conjunctivae normal.   Cardiovascular:      Rate and Rhythm: Normal rate and regular rhythm.      Heart sounds: No murmur heard.  Pulmonary:      Breath sounds: No wheezing or rhonchi.   Musculoskeletal:      Cervical back: Neck supple. Tenderness present. No deformity, rigidity " or bony tenderness. Pain with movement present. Decreased range of motion.      Left hip: Tenderness present. No crepitus. Decreased range of motion. Decreased strength.   Skin:     General: Skin is warm and dry.   Neurological:      Mental Status: She is alert.   Psychiatric:         Mood and Affect: Mood normal.         Behavior: Behavior normal.         Assessment/Plan   Problem List Items Addressed This Visit    None  Visit Diagnoses         Codes    Dizziness    -  Primary R42    EKG and orthostatics reviewed. Check labs to further evaluate. Consider Holter if symptoms persist.     Relevant Orders    ECG 12 Lead (Completed)    Check orthostatic blood pressure (Completed)    Comprehensive Metabolic Panel    CBC and Auto Differential    Tension headache     G44.209    Refer to PT and massage. Recommend heat and neck exercises.     Relevant Medications    diclofenac (Voltaren) 75 mg EC tablet    Other Relevant Orders    Referral to Physical Therapy    Referral to [facility]    Neck pain on left side     M54.2    Referred to PT and massage. Start diclofenac x1 week.     Relevant Medications    diclofenac (Voltaren) 75 mg EC tablet    Other Relevant Orders    Referral to Physical Therapy    Referral to [facility]    Primary osteoarthritis of left hip     M16.12    Referred to PT and massage. Strat diclofenac x1 week.     Relevant Medications    diclofenac (Voltaren) 75 mg EC tablet    Other Relevant Orders    Referral to Physical Therapy    Referral to [facility]

## 2024-12-31 ENCOUNTER — APPOINTMENT (OUTPATIENT)
Dept: CARDIOLOGY | Facility: CLINIC | Age: 34
End: 2024-12-31
Payer: COMMERCIAL

## 2025-01-09 ENCOUNTER — APPOINTMENT (OUTPATIENT)
Dept: PRIMARY CARE | Facility: CLINIC | Age: 35
End: 2025-01-09
Payer: COMMERCIAL

## 2025-01-10 ENCOUNTER — TELEMEDICINE (OUTPATIENT)
Dept: PRIMARY CARE | Facility: CLINIC | Age: 35
End: 2025-01-10
Payer: COMMERCIAL

## 2025-01-10 DIAGNOSIS — H69.93 EUSTACHIAN TUBE DYSFUNCTION, BILATERAL: Primary | ICD-10-CM

## 2025-01-10 DIAGNOSIS — H92.01 OTALGIA OF RIGHT EAR: ICD-10-CM

## 2025-01-10 PROCEDURE — 99214 OFFICE O/P EST MOD 30 MIN: CPT | Performed by: FAMILY MEDICINE

## 2025-01-10 RX ORDER — ONDANSETRON 8 MG/1
1 TABLET, ORALLY DISINTEGRATING ORAL
COMMUNITY
Start: 2024-12-18

## 2025-01-10 RX ORDER — FLUTICASONE PROPIONATE 50 MCG
2 SPRAY, SUSPENSION (ML) NASAL DAILY
Qty: 16 G | Refills: 5 | Status: SHIPPED | OUTPATIENT
Start: 2025-01-10 | End: 2026-01-10

## 2025-01-10 RX ORDER — AMOXICILLIN AND CLAVULANATE POTASSIUM 875; 125 MG/1; MG/1
875 TABLET, FILM COATED ORAL 2 TIMES DAILY
Qty: 14 TABLET | Refills: 0 | Status: SHIPPED | OUTPATIENT
Start: 2025-01-10 | End: 2025-01-17

## 2025-01-10 NOTE — PROGRESS NOTES
I performed this visit using realtime telehealth tools, including an audio/video OR telephone connection between the patient listed who was located in the [facility] [place] and myself, Monse Landa (Board certified in the [employer] [facility]).  At the start of the visit, I introduced myself as Dr. Hernández and verified the patients name, , and current physical location.    If they were currently outside of the state [facility], the visit was ended and the patient was referred to alternative means for evaluation and treatment.   The patient was made aware of the limitations of the telehealth visit.  They will not be physically examined and all issues may not be appropriate for a telehealth visit.  If necessary, an in person referral will be made.      DISCLAIMER:   In preparing for this visit and writing this note, I reviewed previous electronic medical records (labs, imaging and medical charts) of the patient available in the physician portal. Significant findings which helped in decision making are recorded in this encounter charting.    All allergies were reviewed with the patient and all medications reconciled with   the patient.  ____________________________________________________________________________________________  Chief complaint:     Past few weeks has had congestion but not bad--some pressure in sinuses--  Wet sinuses for a few weeks--occas completely congested but come and goes  Last night took shower and pain in eustachian tube area  Right ear pain and cleaned out ear with q-tips --tries to never use q-tips  Pain excruciating in middle of night-- would have gone to ED last night if did not have 2 kids  Took ibuprofen in middle of night--ice --heating pad--slept sitting up  Tickle in back of throat making her cough in middle of night  PCP could not see her today  Sl wetness last night but after shower--  Hurts under ears (along eustachian tube area)  Muffled hearing--  Called mom in middle of night because  hurt so bad-  Used heat and ice  Ice pack helped somewhat with pain outside of ear--  Friend with sinusitis--has been around other sick ppl--    All other ROS (-)    General appearance:  Vitals available from patient? no  Alert, oriented, pleasant, in no apparent distress? yes  Answers questions appropriately? yes  Eyes clear? yes  Is patient in respiratory distress? no  Throat exam: not available  Is patient coughing during consult: no  Audible wheezing noted? : no  Pt sounds congested?:  NO  Sniffing or rhinorrhea?:  NO  Frontal sinus TTP by palpation?  NO  Maxillary sinus TTP by palpation? NO--feels odd  Tender neck LAD by pt exam?: sl sore on sides  Psychiatric: Affect normal? yes  Other relevant physical exam:  Pain with plugging ear  No pain with pulling ear lobe  Q-tip in ear-- not very painful-- does not feel good but does not hurt--sl ear wax--    Assessment and Plan:  Otalgia-- right--   Eustachian tube dysfunction    Sinusitis-- sinus sxs for at least 7 days and getting worse or 10 days with no improvements  Augmentin 875mg twice a day for 7 days  Fluticasone nasal spray for ear pain/eustachian tube dysfunction-- 2 sprays into each nostril TWICE A DAY  _______________________________________________________________________________    Discussed with patient during visit  differential diagnosis; viral versus bacterial infection;  (if relevant); use of medications prescribed and possible side effects; appropriate over-the-counter medications; possible complications ; when to follow-up for in person evaluation.  All patient's questions were answered. Patient has good decision making capacity.  They are alert to person, place, time and situation.  Patient has the ability to communicate choice, understand information, consequences, and reason rationally.  If sent for in person care at  or ED,    I explained why Urgent in person exam was necessary and that failure to have further evaluation, and treatment  today may lead to, negative outcomes, permanent disability, or even death.   If not sent for in person care today,   follow-up with your PCP in 2-3 days, sooner if not  improving.    Follow-up immediately if symptoms worsen.   If experiencing symptoms including but not limited to lethargy / chest pain / weakness / dizziness / difficulty breathing please call 911 or go to the closest emergency department for immediate care.   Limitations to telemedicine include inability to do a complete and accurate physical exam.    Any concerns regarding this were conveyed with the patient and in person follow-up recommended if the nature of their concern/illness does not progress as anticipated during this visit.

## 2025-01-13 ENCOUNTER — APPOINTMENT (OUTPATIENT)
Dept: INTEGRATIVE MEDICINE | Facility: CLINIC | Age: 35
End: 2025-01-13
Payer: COMMERCIAL

## 2025-01-15 ENCOUNTER — TELEMEDICINE (OUTPATIENT)
Dept: PRIMARY CARE | Facility: CLINIC | Age: 35
End: 2025-01-15
Payer: COMMERCIAL

## 2025-01-15 DIAGNOSIS — H10.31 ACUTE BACTERIAL CONJUNCTIVITIS OF RIGHT EYE: Primary | ICD-10-CM

## 2025-01-15 PROBLEM — O14.90 PRE-ECLAMPSIA, ANTEPARTUM (HHS-HCC): Status: RESOLVED | Noted: 2023-09-12 | Resolved: 2025-01-15

## 2025-01-15 PROCEDURE — 99213 OFFICE O/P EST LOW 20 MIN: CPT | Performed by: NURSE PRACTITIONER

## 2025-01-15 PROCEDURE — 1036F TOBACCO NON-USER: CPT | Performed by: NURSE PRACTITIONER

## 2025-01-15 RX ORDER — POLYMYXIN B SULFATE AND TRIMETHOPRIM 1; 10000 MG/ML; [USP'U]/ML
1 SOLUTION OPHTHALMIC EVERY 6 HOURS
Qty: 10 ML | Refills: 0 | Status: SHIPPED | OUTPATIENT
Start: 2025-01-15 | End: 2025-01-22

## 2025-01-16 NOTE — PROGRESS NOTES
Subjective   Patient ID: Kristie Hunt is a 34 y.o. female who presents for Conjunctivitis.    Virtual or Telephone Consent    An interactive audio and video telecommunication system which permits real time communications between the patient (at the originating site) and provider (at the distant site) was utilized to provide this telehealth service.   Verbal consent was requested and obtained from Kristie Hunt on this date, 01/15/25 for a telehealth visit.   Patient is located in [place]    Redness started this afternoon to right eye  Son has bilateral conjunctivitis - diagnosed today  Eye is itchy, increased drainage and tearing.  Drainage is yellow in color  She denies congestion, fever, loss of vision or headache        Conjunctivitis          Review of Systems   All other systems reviewed and are negative.      Objective   There were no vitals taken for this visit.    Physical Exam  Vitals reviewed.   Constitutional:       General: She is not in acute distress.     Appearance: Normal appearance.   HENT:      Nose: Nose normal.   Eyes:      General:         Right eye: Discharge present.         Left eye: No discharge.      Extraocular Movements: Extraocular movements intact.      Pupils: Pupils are equal, round, and reactive to light.   Pulmonary:      Effort: Pulmonary effort is normal.   Neurological:      Mental Status: She is alert.   Psychiatric:         Mood and Affect: Mood normal.         Behavior: Behavior normal.         Thought Content: Thought content normal.         Judgment: Judgment normal.         Assessment/Plan   Problem List Items Addressed This Visit             ICD-10-CM    Acute bacterial conjunctivitis of right eye - Primary H10.31     conjunctivitis of right eye  -  polymyxin eye drops 1 drop each eye 4 times a day x 7 days  -   hand hygiene and infection prevention discussed  - red flags discussed of when to seek care            Relevant Medications    polymyxin B sulf-trimethoprim  (Polytrim) ophthalmic solution

## 2025-01-16 NOTE — ASSESSMENT & PLAN NOTE
conjunctivitis of right eye  -  polymyxin eye drops 1 drop each eye 4 times a day x 7 days  -   hand hygiene and infection prevention discussed  - red flags discussed of when to seek care

## 2025-01-21 NOTE — PATIENT INSTRUCTIONS
"Sinusitis-- sinus sxs for at least 7 days and getting worse or 10 days with no improvements  Augmentin 875mg twice a day for 7 days  Fluticasone nasal spray for ear pain/eustachian tube dysfunction-- 2 sprays into each nostril TWICE A DAY  _______________________________________________________________________________    Please send me a [employer] message if you have any questions or concerns.  FOR NON URGENT questions only.  Allow up to 72 hours for response.    If you have prescription issues or other questions you can email   Yesy Dangelo,  Digital Health Coordinator, at   [email or web address]     Rest and drink plenty of fluids    Tylenol and or motrin as needed for pain and fever (unless you have been told not to take these because of your personal medical history)    Discussed options and precautions (complaint specific and may include)  Viral versus bacterial infection; use of medications; possible side effects; appropriate over-the-counter medications; possible complications and /or when to follow-up.    Limitations to telemedicine include inability to do a complete and accurate physical exam.  Any concerns regarding this were conveyed with the patient and in person follow-up recommended if patient nature of illness does not progress as anticipated during this visit.  Red flags requiring in person care were discussed.     if sent for in person care at  or ED,  it was explained why and failure to have \"higher level of care\" further evaluation, and treatment today may lead, but is not limited to negative outcomes, permanent disability, or even death.     If not sent for in person care today, follow-up with your PCP in 2-3 days, sooner if not  improving.    Follow-up immediately if symptoms worsen. If experiencing symptoms including but not limited to lethargy / chest pain / weakness / dizziness / difficulty breathing please call 911 or go to the emergency department for immediate care.     All " patient's questions were answered. Patient has good decision making capacity.  They are alert to person, place, time and situation.  Patient has the ability to communicate choice, understand information, consequences, and reason rationally.    _________________________________________________________________________________________________________________  To connect with a new PCP please visit [email or web address] or call [phone]        FOR THOSE WITH SYMPTOMS OF ILLNESS/UPPER RESPIRATORY INFECTION/COVID    CDC updated Respiratory Infection guidelines:   When you have a respiratory virus, stay home and away from others (including people  you live with who are not sick) Symptoms can include fever, chills, fatigue, cough,  runny nose, and headache (and others).    You may return to work when the following 3 conditions are met:    1) No fever without the use of a fever reducer for 24 hours  2 symptoms are overall improving AND  3) symptoms are mild     When you go back to your normal activities, take added precaution over the next 5 days, such as taking additional steps for  air, hygiene, masks, physical distancing, and/or testing when you will be around other people indoors.    Keep in mind that you may still be able to spread the virus that made you sick, even if you are feeling better. You are likely to be less contagious at this time, depending on factors like how long you were sick or how sick you were.    If you develop a fever or you start to feel worse after you have gone back to normal activities, stay home and away from others again until, for at least 24 hours, both are true: your symptoms are improving overall, and you have not had a fever (and are not using fever-reducing medication). Then take added precaution for the next 5 days.    If you never had symptoms but tested positive for a respiratory virus?, you may be contagious. For the next 5 days: take added  precaution, such as taking additional steps for  air, hygiene, masks, physical distancing, and/or testing when you will be around other people indoors. This is especially important to protect people with factors that increase their risk of severe illness from respiratory viruses.  Avoid immunocompromised, elderly, pregnant women, infants etc     Over-the-counter cold and cough medications    Take Mucinex for cough, drink plenty of fluids with this medication and will help break up congestion making it easier to cough up    For cough can use honey (children ages 1 and up) in hot tea or hot water. I recommend putting this in an insulated cup and carrying it around throughout the day to sip on.  Have it at your bedside at night in case you wake up coughing.  You can also use honey cough drops (adults and older children).    Recommend nasal saline for use in children and adults.  Neti Peguero can also be helpful.  (Never used tap water and a Neti pot.  Use distilled water.)    If you have plugged up congested ears or the feeling of fluid in your ears, you can use an over-the-counter nasal steroid spray like fluticasone (brand name Flonase) use 2 sprays into each nostril once or twice a day for 7 days.  This will help open up the eustachian tubes and allow the fluid to drain out of your ears.    Sleeping with your head/chest elevated can help with sinus drainage.    Adults only-can use nasal decongestant (like Afrin) at bedtime to open nasal passages so you can breathe through your nose while you sleep; avoid using for longer than 3 days as this medication can become addicting.  Do not use if you have high blood pressure or high heart rate.    For severe pain or fever in adult-Tylenol (2 extra strength) or ibuprofen (3-4 tabs equals 600 to 800 mg) alternating as needed for pain.  Tylenol doses should be 6 to 8 hours apart and ibuprofen doses should be 6 to 8 hours apart.      Common cold medications for adults  explained:    **Cold medications for children are not recommended-only Tylenol, Motrin, honey (only for children older than 1 year), cool-mist humidifier, and nasal saline spray are recommended for children.    Mucinex-(generic name guaifenesin)-is an expectorant.  This will thin out all the thick mucus.  Must drink plenty of liquids for this medicine to work.    Dextromethorphan (brand name Delsym or DM)-this medicine is a cough suppressant--caution/avoid use if taking SSRI medication because of risk of Serotonin Syndrome    Honey in hot water or tea-this is a natural cough suppressant    Decongestant nasal spray- (eg: Afrin) use for temporary relief of nasal congestion-best when used at bedtime to open up nasal passages so that you are not forced to mouth breathe overnight.    Sudafed (generic name pseudoephedrine-this must be bought from the pharmacist) DO NOT use this medicine if you have high blood pressure as it can raise your blood pressure higher.  Do not use if you have any irregular heart rate.  This medicine can help clear congestion in your sinuses.

## 2025-01-28 ENCOUNTER — APPOINTMENT (OUTPATIENT)
Dept: ORTHOPEDIC SURGERY | Facility: CLINIC | Age: 35
End: 2025-01-28
Payer: COMMERCIAL

## 2025-01-29 ENCOUNTER — APPOINTMENT (OUTPATIENT)
Dept: INTEGRATIVE MEDICINE | Facility: CLINIC | Age: 35
End: 2025-01-29
Payer: COMMERCIAL

## 2025-01-31 ENCOUNTER — HOSPITAL ENCOUNTER (OUTPATIENT)
Dept: RADIOLOGY | Facility: EXTERNAL LOCATION | Age: 35
Discharge: HOME | End: 2025-01-31

## 2025-01-31 ENCOUNTER — APPOINTMENT (OUTPATIENT)
Dept: ORTHOPEDIC SURGERY | Facility: CLINIC | Age: 35
End: 2025-01-31
Payer: COMMERCIAL

## 2025-01-31 VITALS — BODY MASS INDEX: 24.8 KG/M2 | HEIGHT: 63 IN | WEIGHT: 140 LBS

## 2025-01-31 DIAGNOSIS — M16.7: Primary | ICD-10-CM

## 2025-01-31 PROCEDURE — 20611 DRAIN/INJ JOINT/BURSA W/US: CPT | Performed by: EMERGENCY MEDICINE

## 2025-01-31 PROCEDURE — 1036F TOBACCO NON-USER: CPT | Performed by: EMERGENCY MEDICINE

## 2025-01-31 PROCEDURE — 3008F BODY MASS INDEX DOCD: CPT | Performed by: EMERGENCY MEDICINE

## 2025-01-31 PROCEDURE — 99214 OFFICE O/P EST MOD 30 MIN: CPT | Performed by: EMERGENCY MEDICINE

## 2025-01-31 RX ORDER — METHYLPREDNISOLONE ACETATE 40 MG/ML
80 INJECTION, SUSPENSION INTRA-ARTICULAR; INTRALESIONAL; INTRAMUSCULAR; SOFT TISSUE
Status: COMPLETED | OUTPATIENT
Start: 2025-01-31 | End: 2025-01-31

## 2025-01-31 RX ORDER — LIDOCAINE HYDROCHLORIDE 10 MG/ML
4 INJECTION, SOLUTION INFILTRATION; PERINEURAL
Status: COMPLETED | OUTPATIENT
Start: 2025-01-31 | End: 2025-01-31

## 2025-01-31 RX ADMIN — LIDOCAINE HYDROCHLORIDE 4 ML: 10 INJECTION, SOLUTION INFILTRATION; PERINEURAL at 10:11

## 2025-01-31 RX ADMIN — METHYLPREDNISOLONE ACETATE 80 MG: 40 INJECTION, SUSPENSION INTRA-ARTICULAR; INTRALESIONAL; INTRAMUSCULAR; SOFT TISSUE at 10:11

## 2025-01-31 ASSESSMENT — PAIN - FUNCTIONAL ASSESSMENT: PAIN_FUNCTIONAL_ASSESSMENT: 0-10

## 2025-01-31 ASSESSMENT — PAIN SCALES - GENERAL: PAINLEVEL_OUTOF10: 5 - MODERATE PAIN

## 2025-01-31 NOTE — PROGRESS NOTES
Subjective    Patient ID: Kristie Hunt is a 34 y.o. female.    Chief Complaint: Pain of the Left Hip (L hip CSI 10/28/2024 helped for 1.5 months. Has not gone to PT due to time constraints. Would like another CSI. 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  She was referred here by Dr. Gale for a possible left hip cortisone injection under ultrasound guidance.  She states that she has been having anterior lateral hip pain for several months now that is getting worse with activity.  She does not take much Tylenol because of her cirrhosis and history of Kulwinder's.  She takes ibuprofen occasionally and also soaks in warm baths which seems to help.  She has not had prior surgeries.  No reported traumatic events or known injuries.  She is trying to avoid surgery if at all possible. We agreed to perform a left hip cortisone injection under ultrasound guidance.  The patient tolerated the procedure well without any complications and activity modifications were reviewed.  We also agreed to send her to physical therapy with an emphasis on her developing and implementing a home exercise program.  She will then follow-up with me in about 4 weeks to determine her response to this plan.  Of note, we also discussed potentially doing gel/PRP injections in the future.     Update on 11/25/2024.  Patient is coming back in for a follow-up visit for her acute on chronic left hip pain after a cortisone injection that we did under ultrasound guidance on 10/28/2024.  She states that after a few days she began to experience excellent pain relief but for the past week it has started to wear off slightly.  Overall she still thinks that she is getting about 50 to 60% of pain relief.  She denies any traumatic events or known injuries.  She has no other complaints or today and  would like to avoid gel or PRP injections with the holidays coming up saying that it would be out of her budget at this time. We discussed her treatment options and decided that overall she is still getting some good pain relief from the injection that we did last month.  The effect of the injection seems to be starting to wear off though so we are going to have her follow-up at the 3-month elena in January for a potential repeat injection.  For now we are going to not do PRP or gel injections.  She wants to avoid surgery for as long as possible.    Update on 1/31/2025.  Patient is coming back in for a follow-up visit for her acute on chronic left hip pain secondary to degenerative changes in the setting of Kulwinder's disease.  She is unable to take Tylenol safely and likes to avoid medications.  We did a cortisone injection back on 10/28/2024 that helped for 1 to 2 months.  She has been doing home exercises instead of formal physical therapy because she does have 2 small children at home.  Her pain has returned and she is interested in getting a repeat cortisone injection here today.  She would like to avoid paying extra for gels or PRP for now and she also wants to avoid a hip replacement for as long as possible.        Objective   Right Hip Exam     Tenderness   The patient is experiencing no tenderness.       Left Hip Exam     Tenderness   The patient is experiencing no tenderness.     Range of Motion   Internal rotation: abnormal     Muscle Strength   The patient has normal left hip strength.   Abduction: 5/5   Adduction: 5/5   Flexion: 5/5     Tests   SARA: positive    Other   Erythema: absent  Sensation: normal  Pulse: present    Comments:  No tenderness to palpation over the gluteal tendon insertion near the greater trochanter.  Strength and sensation intact.  Normal gait.        Exam grossly unchanged    Image Results:  No new imaging    Patient ID: Kristie Hunt is a 34 y.o. female.    L Inj/Asp: L hip  joint on 1/31/2025 10:11 AM  Indications: pain  Details: 20 G needle, ultrasound-guided anterior approach  Medications: 80 mg methylPREDNISolone acetate 40 mg/mL; 4 mL lidocaine 10 mg/mL (1 %)  Outcome: tolerated well, no immediate complications  Procedure, treatment alternatives, risks and benefits explained, specific risks discussed. Consent was given by the patient. Immediately prior to procedure a time out was called to verify the correct patient, procedure, equipment, support staff and site/side marked as required. Patient was prepped and draped in the usual sterile fashion.           Assessment/Plan   Encounter Diagnoses:  Secondary osteoarthritis of left hip    Orders Placed This Encounter    L Inj/Asp    Point of Care Ultrasound     No follow-ups on file.      We discussed her treatment options and again agreed to perform a left hip cortisone injection under ultrasound guidance.  The patient tolerated the procedure well without any complications and activity modifications were reviewed.  She is going to continue ibuprofen as needed and we are going to make her a 3-month follow-up visit for a likely repeat injection.    ** Please excuse any errors in grammar or translation related to this dictation. Voice recognition software was utilized to prepare this document. **       Francisco Grant MD  [facility]

## 2025-02-05 ENCOUNTER — TELEMEDICINE (OUTPATIENT)
Dept: PRIMARY CARE | Facility: CLINIC | Age: 35
End: 2025-02-05
Payer: COMMERCIAL

## 2025-02-05 DIAGNOSIS — A60.9 HSV (HERPES SIMPLEX VIRUS) ANOGENITAL INFECTION: Primary | ICD-10-CM

## 2025-02-05 PROCEDURE — 99213 OFFICE O/P EST LOW 20 MIN: CPT

## 2025-02-05 RX ORDER — VALACYCLOVIR HYDROCHLORIDE 500 MG/1
TABLET, FILM COATED ORAL
Qty: 6 TABLET | Refills: 2 | Status: SHIPPED | OUTPATIENT
Start: 2025-02-05 | End: 2026-02-05

## 2025-02-05 ASSESSMENT — ENCOUNTER SYMPTOMS
EYE PAIN: 0
DIARRHEA: 0
SHORTNESS OF BREATH: 0
SORE THROAT: 0

## 2025-02-06 NOTE — PROGRESS NOTES
On Demand Virtual Visit Patient Consent     This visit was completed via video conference. All issues as below were discussed and addressed but no physical exam was performed. If it was felt that the patient should be evaluated in clinic than they were directed there. The patient verbally consented to the visit.    An interactive audio and video telecommunication system which permits real time communications between the patient (at the originating site) and provider (at the distant site) was utilized to provide this telehealth service.   Verbal consent was requested and obtained from Kristie Hunt (or parent if under 18) 02/05/25 for a telehealth visit.   I have verbally confirmed with Kristie Hunt (or parent if under 18) that they are physically located in the [facility] during this virtual visit.    Subjective   Patient ID: Kristie Hunt is a 34 y.o. female who presents for Mouth Lesions.  Rash  This is a chronic problem. Episode onset: 18 years ago. The problem has been waxing and waning since onset. The affected locations include the genitalia. The rash is characterized by blistering. Associated with: known HSV infections, diagnosis at age 16. Pertinent negatives include no diarrhea, eye pain, shortness of breath or sore throat. Treatments tried: valtrex. The treatment provided significant relief.       Review of Systems   HENT:  Negative for sore throat.    Eyes:  Negative for pain.   Respiratory:  Negative for shortness of breath.    Gastrointestinal:  Negative for diarrhea.   Skin:  Positive for rash.       Objective     There were no vitals taken for this visit.       Physical Exam  Constitutional:       General: She is not in acute distress.     Appearance: Normal appearance. She is not ill-appearing.   HENT:      Nose: No congestion or rhinorrhea.   Pulmonary:      Breath sounds: Normal breath sounds.   Neurological:      Mental Status: She is alert and oriented to person, place, and time.     Pt  with long standing infection in need of refill of medication for episodic treatment of hsv.    Assessment/Plan   Kristie was seen today for mouth lesions.  Diagnoses and all orders for this visit:  HSV (herpes simplex virus) anogenital infection  -     valACYclovir (Valtrex) 500 mg tablet; Take 1 tab (500 mg) by mouth 2 times a day for 3 days.

## 2025-02-13 ENCOUNTER — APPOINTMENT (OUTPATIENT)
Dept: PRIMARY CARE | Facility: CLINIC | Age: 35
End: 2025-02-13
Payer: COMMERCIAL

## 2025-02-19 ENCOUNTER — OFFICE VISIT (OUTPATIENT)
Dept: PRIMARY CARE | Facility: CLINIC | Age: 35
End: 2025-02-19
Payer: COMMERCIAL

## 2025-02-19 VITALS
DIASTOLIC BLOOD PRESSURE: 70 MMHG | BODY MASS INDEX: 26.57 KG/M2 | WEIGHT: 150 LBS | TEMPERATURE: 98.7 F | HEART RATE: 91 BPM | OXYGEN SATURATION: 96 % | SYSTOLIC BLOOD PRESSURE: 118 MMHG

## 2025-02-19 DIAGNOSIS — D22.9 BENIGN MOLE: ICD-10-CM

## 2025-02-19 DIAGNOSIS — R21 RASH: ICD-10-CM

## 2025-02-19 DIAGNOSIS — R22.2 LUMP IN CHEST: Primary | ICD-10-CM

## 2025-02-19 PROCEDURE — 3078F DIAST BP <80 MM HG: CPT | Performed by: FAMILY MEDICINE

## 2025-02-19 PROCEDURE — 99213 OFFICE O/P EST LOW 20 MIN: CPT | Performed by: FAMILY MEDICINE

## 2025-02-19 PROCEDURE — 3074F SYST BP LT 130 MM HG: CPT | Performed by: FAMILY MEDICINE

## 2025-02-19 PROCEDURE — 1036F TOBACCO NON-USER: CPT | Performed by: FAMILY MEDICINE

## 2025-02-19 RX ORDER — MUPIROCIN 20 MG/G
OINTMENT TOPICAL 3 TIMES DAILY
Qty: 22 G | Refills: 0 | Status: SHIPPED | OUTPATIENT
Start: 2025-02-19 | End: 2025-03-01

## 2025-02-19 ASSESSMENT — ENCOUNTER SYMPTOMS
SLEEP DISTURBANCE: 0
SHORTNESS OF BREATH: 0
MYALGIAS: 0
FEVER: 0
HEADACHES: 0
DIZZINESS: 0
DYSPHORIC MOOD: 0
PALPITATIONS: 0
CHILLS: 0
FATIGUE: 0
ABDOMINAL PAIN: 0

## 2025-02-19 NOTE — PROGRESS NOTES
"Subjective   Patient ID: Kristie Hunt is a 34 y.o. female who presents for Mass (X \"years\" last 2 days became tender, red).    HPI   Lump: onset x 2 yrs. Started off as flesh colored, now red and sl tender.     Moles: Reports several moles that she would like evaluated by dermatology. requesting referral    Review of Systems   Constitutional:  Negative for chills, fatigue and fever.   Eyes:  Negative for visual disturbance.   Respiratory:  Negative for shortness of breath.    Cardiovascular:  Negative for chest pain and palpitations.   Gastrointestinal:  Negative for abdominal pain.   Musculoskeletal:  Negative for myalgias.   Neurological:  Negative for dizziness and headaches.   Psychiatric/Behavioral:  Negative for dysphoric mood and sleep disturbance.        Objective   /70   Pulse 91   Temp 37.1 °C (98.7 °F)   Wt 68 kg (150 lb)   SpO2 96%   BMI 26.57 kg/m²     Physical Exam  Vitals and nursing note reviewed.   Constitutional:       General: She is not in acute distress.     Appearance: Normal appearance. She is not toxic-appearing.   HENT:      Head: Normocephalic.   Eyes:      General: No scleral icterus.     Pupils: Pupils are equal, round, and reactive to light.   Cardiovascular:      Rate and Rhythm: Normal rate and regular rhythm.      Heart sounds: No murmur heard.  Pulmonary:      Effort: Pulmonary effort is normal. No respiratory distress.      Breath sounds: Normal breath sounds.   Musculoskeletal:         General: No tenderness.      Right lower leg: No edema.      Left lower leg: No edema.   Skin:     General: Skin is warm.      Comments: 4x 4 mm erythemtous lump between breast. Sl tender   Neurological:      General: No focal deficit present.      Mental Status: She is alert.      Cranial Nerves: No cranial nerve deficit.   Psychiatric:         Mood and Affect: Mood normal.         Assessment/Plan   Problem List Items Addressed This Visit    None  Visit Diagnoses         Codes    Lump " in chest    -  Primary R22.2    Relevant Medications    mupirocin (Bactroban) 2 % ointment    Other Relevant Orders    Referral to Dermatology    Benign mole     D22.9    Relevant Orders    Referral to Dermatology    Rash     R21    Relevant Medications    mupirocin (Bactroban) 2 % ointment

## 2025-02-19 NOTE — PATIENT INSTRUCTIONS
Try to use warm compress several times per day    Use topical ointment    You were referred to derm    Follow up as needed

## 2025-03-05 ENCOUNTER — APPOINTMENT (OUTPATIENT)
Dept: PHYSICAL THERAPY | Facility: HOSPITAL | Age: 35
End: 2025-03-05
Payer: COMMERCIAL

## 2025-03-07 ENCOUNTER — APPOINTMENT (OUTPATIENT)
Dept: INTEGRATIVE MEDICINE | Facility: CLINIC | Age: 35
End: 2025-03-07
Payer: COMMERCIAL

## 2025-03-14 ENCOUNTER — EVALUATION (OUTPATIENT)
Dept: PHYSICAL THERAPY | Facility: HOSPITAL | Age: 35
End: 2025-03-14
Payer: COMMERCIAL

## 2025-03-14 DIAGNOSIS — M16.12 PRIMARY OSTEOARTHRITIS OF LEFT HIP: ICD-10-CM

## 2025-03-14 DIAGNOSIS — M53.82 IMPAIRED RANGE OF MOTION OF CERVICAL SPINE: Primary | ICD-10-CM

## 2025-03-14 DIAGNOSIS — M54.2 NECK PAIN ON LEFT SIDE: ICD-10-CM

## 2025-03-14 DIAGNOSIS — G44.209 TENSION HEADACHE: ICD-10-CM

## 2025-03-14 PROCEDURE — 97161 PT EVAL LOW COMPLEX 20 MIN: CPT | Mod: GP | Performed by: PHYSICAL THERAPIST

## 2025-03-14 PROCEDURE — 97110 THERAPEUTIC EXERCISES: CPT | Mod: GP | Performed by: PHYSICAL THERAPIST

## 2025-03-14 ASSESSMENT — PAIN SCALES - GENERAL
PAINLEVEL_OUTOF10: 2
PAINLEVEL_OUTOF10: 3

## 2025-03-14 ASSESSMENT — PATIENT HEALTH QUESTIONNAIRE - PHQ9
SUM OF ALL RESPONSES TO PHQ9 QUESTIONS 1 AND 2: 0
2. FEELING DOWN, DEPRESSED OR HOPELESS: NOT AT ALL
1. LITTLE INTEREST OR PLEASURE IN DOING THINGS: NOT AT ALL

## 2025-03-14 ASSESSMENT — ENCOUNTER SYMPTOMS
LOSS OF SENSATION IN FEET: 0
OCCASIONAL FEELINGS OF UNSTEADINESS: 0
DEPRESSION: 0

## 2025-03-14 ASSESSMENT — PAIN - FUNCTIONAL ASSESSMENT: PAIN_FUNCTIONAL_ASSESSMENT: 0-10

## 2025-03-14 ASSESSMENT — PAIN DESCRIPTION - DESCRIPTORS
DESCRIPTORS: NUMBNESS;TINGLING
DESCRIPTORS: ACHING;BURNING

## 2025-03-14 NOTE — PROGRESS NOTES
Physical Therapy    Physical Therapy Evaluation and Treatment      Patient Name: Kristie Hunt  MRN: [id number]  : 1990   Today's Date: 3/14/2025  Time Calculation  Start Time: 1030  Stop Time: 1115  Time Calculation (min): 45 min     PT Evaluation Time Entry  PT Evaluation (Low) Time Entry: 30  PT Therapeutic Procedures Time Entry  Therapeutic Exercise Time Entry: 15         Visit # 1    Assessment: Pt present with impaired cervical AROM and UE strength, hip pain with impaired walking tolerance. Pt would benefit from skilled PT to address these limitations for improved function and quality of life.    PT Assessment Results: Decreased strength, Decreased range of motion, Decreased mobility, Pain  Rehab Prognosis: Excellent    Plan:  Treatment/Interventions: Dry needling, Education/ Instruction, Manual therapy, Therapeutic exercises  PT Plan: Skilled PT  PT Frequency: 1 time per week  Duration: 4-6 wks  Rehab Potential: Excellent  Plan of Care Agreement: Patient    Current Problem:   1. Impaired range of motion of cervical spine        2. Tension headache  Referral to Physical Therapy    Refer to PT and massage. Recommend heat and neck exercises.      3. Neck pain on left side  Referral to Physical Therapy    Referred to PT and massage. Start diclofenac x1 week.      4. Primary osteoarthritis of left hip  Referral to Physical Therapy    Referred to PT and massage. Strat diclofenac x1 week.          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 Injections help for a few weeks but no lasting relief. Pain can travel down leg when flared, denies N/T. States she's been told she needs a SHERI, wants to delay due to having young children.    Left-sided neck started hurting around the same time, waking up one morning with difficulty and pain turning head. C/o numbness in left UE (1st three finger worse). States pushing on  certain points in her shoulder can bring on UE symptoms.     Pain Better: heat, rest    Pain Worse: stairs, weightbearing about an an hour, sleeping on left side    Imaging: Unremarkable pelvis and left hip radiographs.     Prior level of function: right handed, independent prior    Current limitations: stairs, lifting, sleeping, house work with more breaks    Home setup: single mom with two young children, stairs with bath upstairs    Work: Sometimes cleans houses    Patient's goal: pain relief, improve mobility, prolong SHERI    Precautions:  Precautions  Precautions Comment: None    Pain:  Pain Assessment  Pain Assessment: 0-10  0-10 (Numeric) Pain Score: 3 (9/10 max)  Pain Type: Chronic pain  Pain Location: Hip  Pain Orientation: Left  Pain Radiating Towards: L LE  Pain Descriptors: Aching, Burning  Pain Frequency: Constant/continuous  Multiple Pain Sites: Two  Pain 2  Pain Score 2: 2 (7/10 max)  Pain Type 2: Chronic pain  Pain Location 2: Neck  Pain Orientation 2: Left  Pain Radiating Towards 2: left hand  Pain Descriptors 2: Numbness, Tingling  Pain Frequency 2: Constant/continuous    Objective:  Objective   Posture: WNL    Trunk AROM:  WNL    L LE strength:  Hip FLEX: 4-/5 with pain  Hip ADD: 5/5  Hip ABD: 4+/5 with pain  Hip EXT: 4+/5  Knee EXT: 5/5  Knee FLEX: 5/5  DF: 5/5    SARA: (+) L  Scour: (+) L  NKECHI: (-) L  Randolph: (-) L    Hamstring Flexibility: 70 deg    TTP prox hip flexors, greater trochanter, along left pelvic crest      Cervical ROM  FLEX: WNL  EXT: WNL  ROT: 68 deg R, 54 deg L  SB: WNL    UE Strength:  Grossly 4/5 L UE    Cervical compression: (-)   Cervical decompression: (-)  Spurling's (-)  Crossover (-)  Boone-Cecil (+) L    Tender to palpation with significant hypertonicity left upper trap, cervical paraspinal and suboccipitally    Outcome Measures:  Other Measures  Lower Extremity Funtional Score (LEFS): 37  Neck Disability Index: 17     Treatments:  EXERCISES Date  Date  Date Date    VISIT# # # # #    REPS REPS REPS REPS          Pulleys              T-band:       Pull downs       Mid rows       Kunal ER       4-way hip SLS left              Corner pec stretch              Lateral T-band walk       Side step-up/over       SLS (on left) foam cone taps              Randolph hip flexor stretch       IT band stretchs              Manual:       IASTM       Traction with suboccipital release       UT/LS stretching       CT mobs, upper cervical                     Dry needling: Left UT                                          HEP: Access Code: [email or web address]    Exercises  - Active Straight Leg Raise with Quad Set  - 1 x daily - 1-3 sets - 10 reps  - Supine Bridge with Mini Swiss Ball Between Knees  - 1 x daily - 1-3 sets - 10 reps  - Hooklying Clamshell with Resistance  - 1 x daily - 1-3 sets - 10 reps  - Upper Cervical Rotation SNAG with Strap (Mirrored)  - 1 x daily - 1 sets - 10 reps          Goals:  Active       Neck pain. left hip pain       <=1/10 neck pain for improved daily activities.        Start:  03/14/25    Expected End:  06/12/25            Improve cervical AROM to WNL all directions for safe driving.         Start:  03/14/25    Expected End:  06/12/25            >4+/5 left hip strength for reciprocal stair gait.        Start:  03/14/25    Expected End:  06/12/25            <=1/10 left hip pain for improved weight bearing tolerance.        Start:  03/14/25    Expected End:  06/12/25            Improve NDI by 8 points for improved mobility.        Start:  03/14/25    Expected End:  06/12/25            Improve LEFS score >=20 points for improved mobility.        Start:  03/14/25    Expected End:  06/12/25                Independent HEP for continued gains after PT is complete.        Start:  03/14/25    Expected End:  06/12/25

## 2025-03-21 ENCOUNTER — APPOINTMENT (OUTPATIENT)
Dept: OBSTETRICS AND GYNECOLOGY | Facility: CLINIC | Age: 35
End: 2025-03-21
Payer: COMMERCIAL

## 2025-03-31 ENCOUNTER — APPOINTMENT (OUTPATIENT)
Dept: PHYSICAL THERAPY | Facility: HOSPITAL | Age: 35
End: 2025-03-31
Payer: COMMERCIAL

## 2025-03-31 ENCOUNTER — DOCUMENTATION (OUTPATIENT)
Dept: PHYSICAL THERAPY | Facility: HOSPITAL | Age: 35
End: 2025-03-31
Payer: COMMERCIAL

## 2025-03-31 NOTE — PROGRESS NOTES
Physical Therapy                 Therapy Communication Note    Patient Name: Kristie Hunt  MRN: [id number]  : 1990  Today's Date: 3/31/2025     Discipline: Physical Therapy    Missed Time: Cancel    Missed Visit Reason:   Appointment canceled by patient via MyChart.

## 2025-04-04 ENCOUNTER — APPOINTMENT (OUTPATIENT)
Dept: OBSTETRICS AND GYNECOLOGY | Facility: CLINIC | Age: 35
End: 2025-04-04
Payer: COMMERCIAL

## 2025-04-04 VITALS
SYSTOLIC BLOOD PRESSURE: 120 MMHG | WEIGHT: 151 LBS | HEIGHT: 65 IN | BODY MASS INDEX: 25.16 KG/M2 | DIASTOLIC BLOOD PRESSURE: 80 MMHG

## 2025-04-04 DIAGNOSIS — Z01.419 ENCOUNTER FOR ANNUAL ROUTINE GYNECOLOGICAL EXAMINATION: ICD-10-CM

## 2025-04-04 DIAGNOSIS — Z70.8 ENCOUNTER FOR SEXUALLY TRANSMITTED DISEASE COUNSELING: ICD-10-CM

## 2025-04-04 DIAGNOSIS — Z12.4 SCREENING FOR MALIGNANT NEOPLASM OF CERVIX: Primary | ICD-10-CM

## 2025-04-04 PROCEDURE — 87661 TRICHOMONAS VAGINALIS AMPLIF: CPT

## 2025-04-04 PROCEDURE — 87491 CHLMYD TRACH DNA AMP PROBE: CPT

## 2025-04-04 PROCEDURE — 87591 N.GONORRHOEAE DNA AMP PROB: CPT

## 2025-04-04 ASSESSMENT — PATIENT HEALTH QUESTIONNAIRE - PHQ9
SUM OF ALL RESPONSES TO PHQ9 QUESTIONS 1 & 2: 0
1. LITTLE INTEREST OR PLEASURE IN DOING THINGS: NOT AT ALL
2. FEELING DOWN, DEPRESSED OR HOPELESS: NOT AT ALL

## 2025-04-04 NOTE — PROGRESS NOTES
Subjective   Patient ID: Kristie Hunt is a 34 y.o. female who presents for No chief complaint on file..  HPI    Review of Systems    Objective   Physical Exam    Assessment/Plan            Sakina Redman MD 04/04/25 11:57 AM

## 2025-04-07 LAB
C TRACH RRNA SPEC QL NAA+PROBE: NEGATIVE
N GONORRHOEA DNA SPEC QL PROBE+SIG AMP: NEGATIVE
T VAGINALIS RRNA SPEC QL NAA+PROBE: NEGATIVE

## 2025-04-09 ENCOUNTER — TREATMENT (OUTPATIENT)
Dept: PHYSICAL THERAPY | Facility: HOSPITAL | Age: 35
End: 2025-04-09
Payer: COMMERCIAL

## 2025-04-09 DIAGNOSIS — M54.2 NECK PAIN ON LEFT SIDE: ICD-10-CM

## 2025-04-09 DIAGNOSIS — M16.12 PRIMARY OSTEOARTHRITIS OF LEFT HIP: ICD-10-CM

## 2025-04-09 DIAGNOSIS — M53.82 IMPAIRED RANGE OF MOTION OF CERVICAL SPINE: ICD-10-CM

## 2025-04-09 DIAGNOSIS — G44.209 TENSION HEADACHE: ICD-10-CM

## 2025-04-09 PROCEDURE — 97110 THERAPEUTIC EXERCISES: CPT | Mod: GP | Performed by: PHYSICAL THERAPIST

## 2025-04-09 PROCEDURE — 97140 MANUAL THERAPY 1/> REGIONS: CPT | Mod: GP | Performed by: PHYSICAL THERAPIST

## 2025-04-09 ASSESSMENT — PAIN SCALES - GENERAL
PAINLEVEL_OUTOF10: 7
PAINLEVEL_OUTOF10: 6

## 2025-04-09 ASSESSMENT — PAIN - FUNCTIONAL ASSESSMENT: PAIN_FUNCTIONAL_ASSESSMENT: 0-10

## 2025-04-09 NOTE — PROGRESS NOTES
"Physical Therapy    Physical Therapy Treatment    Patient Name: Kristie Hunt  MRN: [id number]  : 1990   Today's Date: 2025  Time Calculation  Start Time: 45  Stop Time:   Time Calculation (min): 40 min       PT Therapeutic Procedures Time Entry  Manual Therapy Time Entry: 15  Therapeutic Exercise Time Entry: 25      Visit #2    Assessment:   Pt has pain with several of the exercises requiring them to be cut short. She tolerates the IASTM to left UT well. She was encouraged to keep up with her HEP.    Plan:   Assess response to IASTM, try mechanical cervical traction.     Current Problem  1. Tension headache  Follow Up In Physical Therapy    Refer to PT and massage. Recommend heat and neck exercises.      2. Neck pain on left side  Follow Up In Physical Therapy    Referred to PT and massage. Start diclofenac x1 week.      3. Primary osteoarthritis of left hip  Follow Up In Physical Therapy    Referred to PT and massage. Strat diclofenac x1 week.      4. Impaired range of motion of cervical spine  Follow Up In Physical Therapy          Subjective   General  Pt states she has been doing some of the exercises, more the hip than the neck exercises.    Precautions  Precautions  Precautions Comment: None    Pain  Pain Assessment: 0-10  0-10 (Numeric) Pain Score: 6  Pain Location: Hip  Pain Orientation: Left  Multiple Pain Sites: Two    Objective       Treatments:  EXERCISES Date 2025  Date  Date Date   VISIT# #2 # # #    REPS REPS REPS REPS          Pulleys 2' with inc pain             T-band:       Pull downs Green 2x10      Mid rows Green 2x10      Kunal ER       4-way hip SLS left Unable d/t pain             Corner pec stretch 2x30\" with pain             Lateral T-band walk Red 2 laps      Side step-up/over 4\" 2x10      SLS (on left) foam cone taps              Randolph hip flexor stretch       IT band stretch - L 2x30\" with pain             Manual:       IASTM - L UT 15'                    Dry " needling: Left UT Declined                    Mechanical cervical traction                     HEP: Access Code: [email or web address]    Exercises  - Active Straight Leg Raise with Quad Set  - 1 x daily - 1-3 sets - 10 reps  - Supine Bridge with Mini Swiss Ball Between Knees  - 1 x daily - 1-3 sets - 10 reps  - Hooklying Clamshell with Resistance  - 1 x daily - 1-3 sets - 10 reps  - Upper Cervical Rotation SNAG with Strap (Mirrored)  - 1 x daily - 1 sets - 10 reps         Goals:  Active       Neck pain. left hip pain       <=1/10 neck pain for improved daily activities.        Start:  03/14/25    Expected End:  06/12/25            Improve cervical AROM to WNL all directions for safe driving.         Start:  03/14/25    Expected End:  06/12/25            >4+/5 left hip strength for reciprocal stair gait.        Start:  03/14/25    Expected End:  06/12/25            <=1/10 left hip pain for improved weight bearing tolerance.        Start:  03/14/25    Expected End:  06/12/25            Improve NDI by 8 points for improved mobility.        Start:  03/14/25    Expected End:  06/12/25            Improve LEFS score >=20 points for improved mobility.        Start:  03/14/25    Expected End:  06/12/25                Independent HEP for continued gains after PT is complete.        Start:  03/14/25    Expected End:  06/12/25

## 2025-04-16 ENCOUNTER — TREATMENT (OUTPATIENT)
Dept: PHYSICAL THERAPY | Facility: HOSPITAL | Age: 35
End: 2025-04-16
Payer: COMMERCIAL

## 2025-04-16 DIAGNOSIS — M53.82 IMPAIRED RANGE OF MOTION OF CERVICAL SPINE: ICD-10-CM

## 2025-04-16 DIAGNOSIS — M16.12 PRIMARY OSTEOARTHRITIS OF LEFT HIP: ICD-10-CM

## 2025-04-16 DIAGNOSIS — M54.2 NECK PAIN ON LEFT SIDE: ICD-10-CM

## 2025-04-16 DIAGNOSIS — G44.209 TENSION HEADACHE: ICD-10-CM

## 2025-04-16 PROCEDURE — 97140 MANUAL THERAPY 1/> REGIONS: CPT | Mod: GP,CQ

## 2025-04-16 PROCEDURE — 97110 THERAPEUTIC EXERCISES: CPT | Mod: GP,CQ

## 2025-04-16 ASSESSMENT — PAIN SCALES - GENERAL
PAINLEVEL_OUTOF10: 4
PAINLEVEL_OUTOF10: 8
PAINLEVEL_OUTOF10: 8

## 2025-04-16 ASSESSMENT — PAIN - FUNCTIONAL ASSESSMENT: PAIN_FUNCTIONAL_ASSESSMENT: 0-10

## 2025-04-16 NOTE — PROGRESS NOTES
"Physical Therapy    Physical Therapy Treatment    Patient Name: Kristie Hunt  MRN: [id number]  : 1990   Today's Date: 2025  Time Calculation  Start Time: 939  Stop Time: 0  Time Calculation (min): 41 min     PT Therapeutic Procedures Time Entry  Manual Therapy Time Entry: 10  Therapeutic Exercise Time Entry: 31                 Visit Number: 3/16  Auth Dates: 3/14/25 - 25    Current Problem  Problem List Items Addressed This Visit           ICD-10-CM    Tension headache G44.209    Neck pain on left side M54.2    Primary osteoarthritis of left hip M16.12    Impaired range of motion of cervical spine M53.82        Subjective   Pt states she has a new onset of L sided mid to upper back pain since last session. Pt states this pain is causing her difficulty sleeping and difficulty completing daily tasks, reports she is taking OTC med for pain management with minimal relief. Pt reports compliance with HEP an no recent falls.   Precautions  Precautions  Precautions Comment: None    Pain  Pain Assessment: 0-10  0-10 (Numeric) Pain Score: 4  Pain Location: Hip  Pain Orientation: Left  Multiple Pain Sites: Three      Objective   Pt arrived late to session   Pt demonstrates rounded shoulder forward head posture  Trigger point noted in L rhomboid/thoracic paraspinals and hypertonicity in L UT.      Treatments:  EXERCISES Date 2025  Date 2025 Date Date   VISIT# #2 #3 # #    REPS REPS REPS REPS          Pulleys 2' with inc pain      NuStep  L2 10min with HP             T-band:       Pull downs Green 2x10 Hold     Mid rows Green 2x10 Hold     Kunal ER       4-way hip SLS left Unable d/t pain             Corner pec stretch 2x30\" with pain [id number] (low 1 UE at a time)            Lateral T-band walk Red 2 laps      Side step-up/over 4\" 2x10      SLS (on left) foam cone taps              Randolph hip flexor stretch       IT band stretch - L 2x30\" with pain      PB DKTC   X10ea      PB LTR  X10ea      Open " book stretch   ^pn            Manual:       IASTM - L UT 15'                    Dry needling: Left UT Declined                    Mechanical cervical traction                     HEP: Access Code: [email or web address]    Exercises  - Active Straight Leg Raise with Quad Set  - 1 x daily - 1-3 sets - 10 reps  - Supine Bridge with Mini Swiss Ball Between Knees  - 1 x daily - 1-3 sets - 10 reps  - Hooklying Clamshell with Resistance  - 1 x daily - 1-3 sets - 10 reps  - Upper Cervical Rotation SNAG with Strap (Mirrored)  - 1 x daily - 1 sets - 10 reps         Assessment:   Pt tolerated all exercises well with minimal difficulty reporting decreased L sided neck and upper/mid back pain. Muscle tightness decreased with IASTM this date. Discussed stress management techniques and proper posture to decrease muscle tightness.     Plan:   Continue to improve overall strength and decrease muscle tightness to improve functional abilities with decreased pain.     Goals:  Active       Neck pain. left hip pain       <=1/10 neck pain for improved daily activities.        Start:  03/14/25    Expected End:  06/12/25            Improve cervical AROM to WNL all directions for safe driving.         Start:  03/14/25    Expected End:  06/12/25            >4+/5 left hip strength for reciprocal stair gait.        Start:  03/14/25    Expected End:  06/12/25            <=1/10 left hip pain for improved weight bearing tolerance.        Start:  03/14/25    Expected End:  06/12/25            Improve NDI by 8 points for improved mobility.        Start:  03/14/25    Expected End:  06/12/25            Improve LEFS score >=20 points for improved mobility.        Start:  03/14/25    Expected End:  06/12/25                Independent HEP for continued gains after PT is complete.        Start:  03/14/25    Expected End:  06/12/25

## 2025-04-17 LAB
CYTOLOGY CMNT CVX/VAG CYTO-IMP: NORMAL
HPV HR 12 DNA GENITAL QL NAA+PROBE: NEGATIVE
HPV HR GENOTYPES PNL CVX NAA+PROBE: NEGATIVE
HPV16 DNA SPEC QL NAA+PROBE: NEGATIVE
HPV18 DNA SPEC QL NAA+PROBE: NEGATIVE
LAB AP HPV GENOTYPE QUESTION: YES
LAB AP HPV HR: NORMAL
LAB AP PAP ADDITIONAL TESTS: NORMAL
LABORATORY COMMENT REPORT: NORMAL
PATH REPORT.TOTAL CANCER: NORMAL

## 2025-04-23 ENCOUNTER — DOCUMENTATION (OUTPATIENT)
Dept: PHYSICAL THERAPY | Facility: HOSPITAL | Age: 35
End: 2025-04-23
Payer: COMMERCIAL

## 2025-04-23 ENCOUNTER — APPOINTMENT (OUTPATIENT)
Dept: PHYSICAL THERAPY | Facility: HOSPITAL | Age: 35
End: 2025-04-23
Payer: COMMERCIAL

## 2025-04-23 NOTE — PROGRESS NOTES
Physical Therapy                 Therapy Communication Note    Patient Name: Kristie Hunt  MRN: [id number]  : 1990   Today's Date: 2025     Discipline: Physical Therapy    Missed Time: Cancel    Comment: Canceled via [employer].

## 2025-05-02 ENCOUNTER — APPOINTMENT (OUTPATIENT)
Dept: ORTHOPEDIC SURGERY | Facility: CLINIC | Age: 35
End: 2025-05-02
Payer: COMMERCIAL

## 2025-05-08 ENCOUNTER — APPOINTMENT (OUTPATIENT)
Dept: RADIOLOGY | Facility: HOSPITAL | Age: 35
End: 2025-05-08
Payer: COMMERCIAL

## 2025-05-08 ENCOUNTER — HOSPITAL ENCOUNTER (EMERGENCY)
Facility: HOSPITAL | Age: 35
Discharge: HOME | End: 2025-05-09
Attending: EMERGENCY MEDICINE
Payer: COMMERCIAL

## 2025-05-08 ENCOUNTER — APPOINTMENT (OUTPATIENT)
Dept: CARDIOLOGY | Facility: HOSPITAL | Age: 35
End: 2025-05-08
Payer: COMMERCIAL

## 2025-05-08 DIAGNOSIS — R10.11 RIGHT UPPER QUADRANT ABDOMINAL PAIN: Primary | ICD-10-CM

## 2025-05-08 LAB
ALBUMIN SERPL BCP-MCNC: 4.2 G/DL (ref 3.4–5)
ALP SERPL-CCNC: 68 U/L (ref 33–110)
ALT SERPL W P-5'-P-CCNC: 19 U/L (ref 7–45)
ANION GAP SERPL CALC-SCNC: 13 MMOL/L (ref 10–20)
APPEARANCE UR: CLEAR
AST SERPL W P-5'-P-CCNC: 18 U/L (ref 9–39)
B-HCG SERPL-ACNC: <2 MIU/ML
BASOPHILS # BLD AUTO: 0.04 X10*3/UL (ref 0–0.1)
BASOPHILS NFR BLD AUTO: 0.5 %
BILIRUB DIRECT SERPL-MCNC: 0.1 MG/DL (ref 0–0.3)
BILIRUB SERPL-MCNC: 0.5 MG/DL (ref 0–1.2)
BILIRUB UR STRIP.AUTO-MCNC: NEGATIVE MG/DL
BUN SERPL-MCNC: 12 MG/DL (ref 6–23)
CALCIUM SERPL-MCNC: 9 MG/DL (ref 8.6–10.3)
CHLORIDE SERPL-SCNC: 107 MMOL/L (ref 98–107)
CO2 SERPL-SCNC: 19 MMOL/L (ref 21–32)
COLOR UR: NORMAL
CREAT SERPL-MCNC: 0.69 MG/DL (ref 0.5–1.05)
D DIMER PPP FEU-MCNC: 239 NG/ML FEU
EGFRCR SERPLBLD CKD-EPI 2021: >90 ML/MIN/1.73M*2
EOSINOPHIL # BLD AUTO: 0.21 X10*3/UL (ref 0–0.7)
EOSINOPHIL NFR BLD AUTO: 2.7 %
ERYTHROCYTE [DISTWIDTH] IN BLOOD BY AUTOMATED COUNT: 13.4 % (ref 11.5–14.5)
GLUCOSE SERPL-MCNC: 88 MG/DL (ref 74–99)
GLUCOSE UR STRIP.AUTO-MCNC: NORMAL MG/DL
HCT VFR BLD AUTO: 38.9 % (ref 36–46)
HGB BLD-MCNC: 13 G/DL (ref 12–16)
IMM GRANULOCYTES # BLD AUTO: 0.03 X10*3/UL (ref 0–0.7)
IMM GRANULOCYTES NFR BLD AUTO: 0.4 % (ref 0–0.9)
KETONES UR STRIP.AUTO-MCNC: NEGATIVE MG/DL
LACTATE SERPL-SCNC: 1.2 MMOL/L (ref 0.4–2)
LEUKOCYTE ESTERASE UR QL STRIP.AUTO: NEGATIVE
LIPASE SERPL-CCNC: 40 U/L (ref 9–82)
LYMPHOCYTES # BLD AUTO: 3.11 X10*3/UL (ref 1.2–4.8)
LYMPHOCYTES NFR BLD AUTO: 39.3 %
MCH RBC QN AUTO: 27.5 PG (ref 26–34)
MCHC RBC AUTO-ENTMCNC: 33.4 G/DL (ref 32–36)
MCV RBC AUTO: 82 FL (ref 80–100)
MONOCYTES # BLD AUTO: 0.41 X10*3/UL (ref 0.1–1)
MONOCYTES NFR BLD AUTO: 5.2 %
NEUTROPHILS # BLD AUTO: 4.12 X10*3/UL (ref 1.2–7.7)
NEUTROPHILS NFR BLD AUTO: 51.9 %
NITRITE UR QL STRIP.AUTO: NEGATIVE
NRBC BLD-RTO: 0 /100 WBCS (ref 0–0)
PH UR STRIP.AUTO: 7 [PH]
PLATELET # BLD AUTO: 207 X10*3/UL (ref 150–450)
POTASSIUM SERPL-SCNC: 3.4 MMOL/L (ref 3.5–5.3)
PROT SERPL-MCNC: 7.2 G/DL (ref 6.4–8.2)
PROT UR STRIP.AUTO-MCNC: NEGATIVE MG/DL
RBC # BLD AUTO: 4.72 X10*6/UL (ref 4–5.2)
RBC # UR STRIP.AUTO: NEGATIVE MG/DL
SODIUM SERPL-SCNC: 136 MMOL/L (ref 136–145)
SP GR UR STRIP.AUTO: 1.02
UROBILINOGEN UR STRIP.AUTO-MCNC: NORMAL MG/DL
WBC # BLD AUTO: 7.9 X10*3/UL (ref 4.4–11.3)

## 2025-05-08 PROCEDURE — 82248 BILIRUBIN DIRECT: CPT | Performed by: EMERGENCY MEDICINE

## 2025-05-08 PROCEDURE — 83605 ASSAY OF LACTIC ACID: CPT | Performed by: EMERGENCY MEDICINE

## 2025-05-08 PROCEDURE — 93005 ELECTROCARDIOGRAM TRACING: CPT

## 2025-05-08 PROCEDURE — 71045 X-RAY EXAM CHEST 1 VIEW: CPT | Performed by: RADIOLOGY

## 2025-05-08 PROCEDURE — 36415 COLL VENOUS BLD VENIPUNCTURE: CPT | Performed by: EMERGENCY MEDICINE

## 2025-05-08 PROCEDURE — 96374 THER/PROPH/DIAG INJ IV PUSH: CPT | Mod: 59

## 2025-05-08 PROCEDURE — 80053 COMPREHEN METABOLIC PANEL: CPT | Performed by: EMERGENCY MEDICINE

## 2025-05-08 PROCEDURE — 85379 FIBRIN DEGRADATION QUANT: CPT | Performed by: EMERGENCY MEDICINE

## 2025-05-08 PROCEDURE — 2550000001 HC RX 255 CONTRASTS: Mod: JZ | Performed by: EMERGENCY MEDICINE

## 2025-05-08 PROCEDURE — 83690 ASSAY OF LIPASE: CPT | Performed by: EMERGENCY MEDICINE

## 2025-05-08 PROCEDURE — 2500000004 HC RX 250 GENERAL PHARMACY W/ HCPCS (ALT 636 FOR OP/ED): Mod: JW | Performed by: EMERGENCY MEDICINE

## 2025-05-08 PROCEDURE — 96376 TX/PRO/DX INJ SAME DRUG ADON: CPT | Mod: 59

## 2025-05-08 PROCEDURE — 99285 EMERGENCY DEPT VISIT HI MDM: CPT | Mod: 25 | Performed by: EMERGENCY MEDICINE

## 2025-05-08 PROCEDURE — 71045 X-RAY EXAM CHEST 1 VIEW: CPT

## 2025-05-08 PROCEDURE — 74174 CTA ABD&PLVS W/CONTRAST: CPT

## 2025-05-08 PROCEDURE — 74174 CTA ABD&PLVS W/CONTRAST: CPT | Performed by: STUDENT IN AN ORGANIZED HEALTH CARE EDUCATION/TRAINING PROGRAM

## 2025-05-08 PROCEDURE — 85025 COMPLETE CBC W/AUTO DIFF WBC: CPT | Performed by: EMERGENCY MEDICINE

## 2025-05-08 PROCEDURE — 81003 URINALYSIS AUTO W/O SCOPE: CPT | Performed by: EMERGENCY MEDICINE

## 2025-05-08 PROCEDURE — 84702 CHORIONIC GONADOTROPIN TEST: CPT | Performed by: EMERGENCY MEDICINE

## 2025-05-08 RX ORDER — KETOROLAC TROMETHAMINE 30 MG/ML
15 INJECTION, SOLUTION INTRAMUSCULAR; INTRAVENOUS ONCE
Status: COMPLETED | OUTPATIENT
Start: 2025-05-08 | End: 2025-05-08

## 2025-05-08 RX ADMIN — KETOROLAC TROMETHAMINE 15 MG: 30 INJECTION, SOLUTION INTRAMUSCULAR at 20:27

## 2025-05-08 RX ADMIN — IOHEXOL 100 ML: 350 INJECTION, SOLUTION INTRAVENOUS at 22:22

## 2025-05-08 RX ADMIN — KETOROLAC TROMETHAMINE 15 MG: 30 INJECTION, SOLUTION INTRAMUSCULAR at 23:01

## 2025-05-08 ASSESSMENT — PAIN SCALES - GENERAL
PAINLEVEL_OUTOF10: 5 - MODERATE PAIN
PAINLEVEL_OUTOF10: 10 - WORST POSSIBLE PAIN
PAINLEVEL_OUTOF10: 6

## 2025-05-08 ASSESSMENT — PAIN - FUNCTIONAL ASSESSMENT
PAIN_FUNCTIONAL_ASSESSMENT: 0-10

## 2025-05-08 ASSESSMENT — LIFESTYLE VARIABLES
TOTAL SCORE: 0
EVER HAD A DRINK FIRST THING IN THE MORNING TO STEADY YOUR NERVES TO GET RID OF A HANGOVER: NO
HAVE PEOPLE ANNOYED YOU BY CRITICIZING YOUR DRINKING: NO
EVER FELT BAD OR GUILTY ABOUT YOUR DRINKING: NO
HAVE YOU EVER FELT YOU SHOULD CUT DOWN ON YOUR DRINKING: NO

## 2025-05-08 ASSESSMENT — COLUMBIA-SUICIDE SEVERITY RATING SCALE - C-SSRS
2. HAVE YOU ACTUALLY HAD ANY THOUGHTS OF KILLING YOURSELF?: NO
1. IN THE PAST MONTH, HAVE YOU WISHED YOU WERE DEAD OR WISHED YOU COULD GO TO SLEEP AND NOT WAKE UP?: NO
6. HAVE YOU EVER DONE ANYTHING, STARTED TO DO ANYTHING, OR PREPARED TO DO ANYTHING TO END YOUR LIFE?: NO

## 2025-05-08 ASSESSMENT — PAIN DESCRIPTION - PAIN TYPE: TYPE: ACUTE PAIN

## 2025-05-08 ASSESSMENT — PAIN DESCRIPTION - LOCATION: LOCATION: ABDOMEN

## 2025-05-08 ASSESSMENT — PAIN DESCRIPTION - ORIENTATION: ORIENTATION: RIGHT

## 2025-05-08 NOTE — ED TRIAGE NOTES
Pt presents to the Ed for right sided abdominal pain. States pain came out of nowhere, 10/10 in short, random bursts. 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 Pt states nausea, but no vomiting.

## 2025-05-09 VITALS
RESPIRATION RATE: 16 BRPM | OXYGEN SATURATION: 99 % | DIASTOLIC BLOOD PRESSURE: 66 MMHG | SYSTOLIC BLOOD PRESSURE: 119 MMHG | WEIGHT: 150 LBS | HEIGHT: 64 IN | HEART RATE: 74 BPM | BODY MASS INDEX: 25.61 KG/M2 | TEMPERATURE: 98.2 F

## 2025-05-09 LAB — HOLD SPECIMEN: NORMAL

## 2025-05-09 NOTE — ED PROVIDER NOTES
Emergency Department Provider Note       HPI: []  34-year-old white female history of Kulwinder disease and liver cirrhosis comes in with right lower quadrant pain.  She states she was driving and she developed sudden onset of right upper quadrant right lower chest wall pain.  Hurts with movement, no nausea vomiting diarrhea constipation no fever no chills no hematemesis melena medic easier no hemoptysis no recent travel or hospitalization.  No injuries.  No recent sick contacts.  This never happened before.    Past history: Liver cirrhosis, Kulwinder disease  Social: Patient denies current tobacco alcohol drug abuse.  REVIEW OF SYSTEMS:    GENERAL.: No weight loss, fatigue, anorexia, insomnia, fever.    EYES: No vision loss, double vision, drainage, eye pain.    ENT: No pharyngitis, dry mouth.    CARDIOPULMONARY: No chest pain, palpitations, syncope, near syncope. No shortness of breath, cough, hemoptysis.    GI: Positive for abdominal pain, change in bowel habits, melena, hematemesis, hematochezia, nausea, vomiting, diarrhea.    : No discharge, dysuria, frequency, urgency, hematuria.    MS: No limb pain, joint pain, joint swelling.    SKIN: No rashes.    PSYCH: No depression, anxiety, suicidality, homicidality.    Review of systems is otherwise negative unless stated above or in history of present illness.  Social history, family history, allergies reviewed.  PHYSICAL EXAM:    GENERAL: Vitals noted, moderate distress. Alert and oriented  x 3. Non-toxic.  Appears uncomfortable    EENT: TMs clear. Posterior oropharynx unremarkable. No meningismus. No LAD.     NECK: Supple. Nontender. No midline tenderness.     CARDIAC: Regular, rate, rhythm. No murmurs rubs or gallops. No JVD    PULMONARY: Lungs clear bilaterally with good aeration. No wheezes rales or rhonchi. No respiratory distress.     ABDOMEN: Soft, nonsurgical.  Tender right upper quadrant no rebound or guarding. No peritoneal signs. Normoactive bowel sounds. No  pulsatile masses.  Negative Rob sign and negative CVA tenderness    EXTREMITIES: No peripheral edema. Negative Homans bilaterally, no cords. 2 Plus bounding pulses are perfused.  Musculoskeletal: Patient reproducible right lower anterior chest wall tenderness.  SKIN: No rash. Intact.  No evidence of any rashes consistent with zoster.    NEURO: No focal neurologic deficits, NIH score of 0. Cranial nerves normal as tested from II through XII.     MEDICAL DECISION MAKING:    EKG on my interpretation shows normal sinus rhythm normal axis rate mid 70s with no acute ischemic changes.    CBC with differential chemistry LFTs normal lipase normal lactate normal D-dimer negative chest x-ray negative CT angio of the abdomen shows no evidence of portal vein thrombosis does demonstrate liver cirrhosis.    Treatment in the ED: IV established patient on cardiac monitor given IV Toradol    ED course: Repeat assessment feeling much better remains afebrile normotensive no tachycardia or hypoxia    Impression: Abdominal pain nonspecific    Plan/MDM: 34-year-old female with history of liver cirrhosis and Kulwinder disease comes in with atraumatic right upper quadrant and right lower chest wall pain.  Her workup is very negative my suspicion for portal vein thrombosis, liver failure, pulm embolism, is low.  Patient's exam is fairly reassuring pain is most likely mechanical musculoskeletal be discharged with supportive care and outpatient follow-up recommended with the primary doctor and her hepatologist with strict return precautions                                                              Dolores Hicks MD  05/09/25 0001       Dolores Hicks MD  05/09/25 0002

## 2025-05-13 ENCOUNTER — APPOINTMENT (OUTPATIENT)
Dept: ORTHOPEDIC SURGERY | Facility: CLINIC | Age: 35
End: 2025-05-13
Payer: COMMERCIAL

## 2025-05-20 LAB
ATRIAL RATE: 73 BPM
P AXIS: 67 DEGREES
PR INTERVAL: 156 MS
Q ONSET: 251 MS
QRS COUNT: 12 BEATS
QRS DURATION: 79 MS
QT INTERVAL: 354 MS
QTC CALCULATION(BAZETT): 388 MS
QTC FREDERICIA: 376 MS
R AXIS: 48 DEGREES
T AXIS: 62 DEGREES
T OFFSET: 428 MS
VENTRICULAR RATE: 72 BPM

## 2025-05-27 ENCOUNTER — HOSPITAL ENCOUNTER (OUTPATIENT)
Dept: RADIOLOGY | Facility: EXTERNAL LOCATION | Age: 35
Discharge: HOME | End: 2025-05-27

## 2025-05-27 ENCOUNTER — APPOINTMENT (OUTPATIENT)
Dept: ORTHOPEDIC SURGERY | Facility: CLINIC | Age: 35
End: 2025-05-27
Payer: COMMERCIAL

## 2025-05-27 VITALS — HEIGHT: 65 IN | WEIGHT: 151.2 LBS | BODY MASS INDEX: 25.19 KG/M2

## 2025-05-27 DIAGNOSIS — M16.7: Primary | ICD-10-CM

## 2025-05-27 DIAGNOSIS — M25.552 PAIN OF LEFT HIP: ICD-10-CM

## 2025-05-27 RX ORDER — LIDOCAINE HYDROCHLORIDE 10 MG/ML
4 INJECTION, SOLUTION INFILTRATION; PERINEURAL
Status: COMPLETED | OUTPATIENT
Start: 2025-05-27 | End: 2025-05-27

## 2025-05-27 RX ORDER — METHYLPREDNISOLONE ACETATE 40 MG/ML
80 INJECTION, SUSPENSION INTRA-ARTICULAR; INTRALESIONAL; INTRAMUSCULAR; SOFT TISSUE
Status: COMPLETED | OUTPATIENT
Start: 2025-05-27 | End: 2025-05-27

## 2025-05-27 RX ADMIN — METHYLPREDNISOLONE ACETATE 80 MG: 40 INJECTION, SUSPENSION INTRA-ARTICULAR; INTRALESIONAL; INTRAMUSCULAR; SOFT TISSUE at 09:39

## 2025-05-27 RX ADMIN — LIDOCAINE HYDROCHLORIDE 4 ML: 10 INJECTION, SOLUTION INFILTRATION; PERINEURAL at 09:39

## 2025-05-27 ASSESSMENT — PAIN SCALES - GENERAL: PAINLEVEL_OUTOF10: 4

## 2025-05-27 ASSESSMENT — PAIN - FUNCTIONAL ASSESSMENT: PAIN_FUNCTIONAL_ASSESSMENT: 0-10

## 2025-05-27 NOTE — PROGRESS NOTES
Subjective    Patient ID: Kristie Hunt is a 34 y.o. female.    Chief Complaint: Pain, Follow-up, and Injections of the Left Hip (They were last given a L hip joint CSI during their last visit on 1/31/2025, which provided good improvement for 1.5-2 months. They would like another CSI today. No new injuries or symptoms. 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  She was referred here by Dr. Gale for a possible left hip cortisone injection under ultrasound guidance.  She states that she has been having anterior lateral hip pain for several months now that is getting worse with activity.  She does not take much Tylenol because of her cirrhosis and history of Kulwinder's.  She takes ibuprofen occasionally and also soaks in warm baths which seems to help.  She has not had prior surgeries.  No reported traumatic events or known injuries.  She is trying to avoid surgery if at all possible. We agreed to perform a left hip cortisone injection under ultrasound guidance.  The patient tolerated the procedure well without any complications and activity modifications were reviewed.  We also agreed to send her to physical therapy with an emphasis on her developing and implementing a home exercise program.  She will then follow-up with me in about 4 weeks to determine her response to this plan.  Of note, we also discussed potentially doing gel/PRP injections in the future.     Update on 11/25/2024.  Patient is coming back in for a follow-up visit for her acute on chronic left hip pain after a cortisone injection that we did under ultrasound guidance on 10/28/2024.  She states that after a few days she began to experience excellent pain relief but for the past week it has started to wear off slightly.  Overall she still thinks that she is getting about 50 to 60% of  pain relief.  She denies any traumatic events or known injuries.  She has no other complaints or today and would like to avoid gel or PRP injections with the holidays coming up saying that it would be out of her budget at this time. We discussed her treatment options and decided that overall she is still getting some good pain relief from the injection that we did last month.  The effect of the injection seems to be starting to wear off though so we are going to have her follow-up at the 3-month elena in January for a potential repeat injection.  For now we are going to not do PRP or gel injections.  She wants to avoid surgery for as long as possible.    Update on 1/31/2025.  Patient is coming back in for a follow-up visit for her acute on chronic left hip pain secondary to degenerative changes in the setting of Kulwinder's disease.  She is unable to take Tylenol safely and likes to avoid medications.  We did a cortisone injection back on 10/28/2024 that helped for 1 to 2 months.  She has been doing home exercises instead of formal physical therapy because she does have 2 small children at home.  Her pain has returned and she is interested in getting a repeat cortisone injection here today.  She would like to avoid paying extra for gels or PRP for now and she also wants to avoid a hip replacement for as long as possible. We discussed her treatment options and again agreed to perform a left hip cortisone injection under ultrasound guidance.  The patient tolerated the procedure well without any complications and activity modifications were reviewed.  She is going to continue ibuprofen as needed and we are going to make her a 3-month follow-up visit for a likely repeat injection.    Update on 5/27/2025.  Patient is coming back in for follow-up visit for acute on chronic left hip pain secondary to degenerative changes with her history of Kulwinder's disease.  The last injection seems to have worked a little bit better than the  prior which is really reassuring.  She also thinks that the warmer weather is helping give her some pain relief.  She is here today for repeat injection potentially.  She still would like to hold off on doing gel or PRP injections.  No trauma.  No infectious symptoms.  No other complaints or today.        Objective   Right Hip Exam     Tenderness   The patient is experiencing no tenderness.       Left Hip Exam     Tenderness   The patient is experiencing no tenderness.     Range of Motion   Internal rotation: abnormal Left hip internal rotation: Painful.    Muscle Strength   The patient has normal left hip strength.   Abduction: 5/5   Adduction: 5/5   Flexion: 5/5     Tests   SARA: positive    Other   Erythema: absent  Sensation: normal  Pulse: present    Comments:  No tenderness to palpation over the gluteal tendon insertion near the greater trochanter.  Strength and sensation intact.  Normal gait.        Exam grossly unchanged    Image Results:  No new imaging    Patient ID: Kristie Hunt is a 34 y.o. female.    L Inj/Asp: L hip joint on 5/27/2025 9:39 AM  Indications: pain  Details: 20 G needle, ultrasound-guided anterior approach  Medications: 80 mg methylPREDNISolone acetate 40 mg/mL; 4 mL lidocaine 10 mg/mL (1 %)  Outcome: tolerated well, no immediate complications  Procedure, treatment alternatives, risks and benefits explained, specific risks discussed. Consent was given by the patient. Immediately prior to procedure a time out was called to verify the correct patient, procedure, equipment, support staff and site/side marked as required. Patient was prepped and draped in the usual sterile fashion.           Assessment/Plan   Encounter Diagnoses:  Secondary osteoarthritis of left hip    Pain of left hip    Orders Placed This Encounter    L Inj/Asp    Point of Care Ultrasound     No follow-ups on file.      We discussed her treatment options and again agreed to perform a left hip cortisone injection under  ultrasound guidance.  The patient tolerated the procedure well without any complications and activity modifications were reviewed.  She is going to continue ibuprofen as needed and we are going to make her a 3-month follow-up visit for a likely repeat injection.    ** Please excuse any errors in grammar or translation related to this dictation. Voice recognition software was utilized to prepare this document. **       Francisco Grant MD  [facility]

## 2025-07-03 ENCOUNTER — APPOINTMENT (OUTPATIENT)
Dept: PRIMARY CARE | Facility: CLINIC | Age: 35
End: 2025-07-03
Payer: COMMERCIAL

## 2025-08-12 ENCOUNTER — APPOINTMENT (OUTPATIENT)
Dept: PRIMARY CARE | Facility: CLINIC | Age: 35
End: 2025-08-12
Payer: COMMERCIAL

## 2025-08-21 ENCOUNTER — APPOINTMENT (OUTPATIENT)
Dept: PRIMARY CARE | Facility: CLINIC | Age: 35
End: 2025-08-21
Payer: COMMERCIAL

## 2025-08-26 ENCOUNTER — HOSPITAL ENCOUNTER (OUTPATIENT)
Dept: RADIOLOGY | Facility: EXTERNAL LOCATION | Age: 35
Discharge: HOME | End: 2025-08-26

## 2025-08-26 ENCOUNTER — APPOINTMENT (OUTPATIENT)
Dept: ORTHOPEDIC SURGERY | Facility: CLINIC | Age: 35
End: 2025-08-26
Payer: COMMERCIAL

## 2025-08-26 VITALS — HEIGHT: 65 IN | WEIGHT: 151 LBS | BODY MASS INDEX: 25.16 KG/M2

## 2025-08-26 DIAGNOSIS — M16.7: Primary | ICD-10-CM

## 2025-08-26 RX ORDER — METHYLPREDNISOLONE ACETATE 40 MG/ML
80 INJECTION, SUSPENSION INTRA-ARTICULAR; INTRALESIONAL; INTRAMUSCULAR; SOFT TISSUE
Status: COMPLETED | OUTPATIENT
Start: 2025-08-26 | End: 2025-08-26

## 2025-08-26 RX ORDER — LIDOCAINE HYDROCHLORIDE 10 MG/ML
4 INJECTION, SOLUTION INFILTRATION; PERINEURAL
Status: COMPLETED | OUTPATIENT
Start: 2025-08-26 | End: 2025-08-26

## 2025-08-26 RX ADMIN — METHYLPREDNISOLONE ACETATE 80 MG: 40 INJECTION, SUSPENSION INTRA-ARTICULAR; INTRALESIONAL; INTRAMUSCULAR; SOFT TISSUE at 10:04

## 2025-08-26 RX ADMIN — LIDOCAINE HYDROCHLORIDE 4 ML: 10 INJECTION, SOLUTION INFILTRATION; PERINEURAL at 10:04

## 2025-11-11 ENCOUNTER — APPOINTMENT (OUTPATIENT)
Dept: CARDIOLOGY | Facility: CLINIC | Age: 35
End: 2025-11-11
Payer: COMMERCIAL

## 2026-04-13 ENCOUNTER — APPOINTMENT (OUTPATIENT)
Dept: OBSTETRICS AND GYNECOLOGY | Facility: CLINIC | Age: 36
End: 2026-04-13
Payer: COMMERCIAL